# Patient Record
Sex: FEMALE | Race: WHITE | NOT HISPANIC OR LATINO | Employment: FULL TIME | URBAN - METROPOLITAN AREA
[De-identification: names, ages, dates, MRNs, and addresses within clinical notes are randomized per-mention and may not be internally consistent; named-entity substitution may affect disease eponyms.]

---

## 2021-07-28 ENCOUNTER — TRANSFERRED RECORDS (OUTPATIENT)
Dept: HEALTH INFORMATION MANAGEMENT | Facility: CLINIC | Age: 26
End: 2021-07-28

## 2021-07-28 LAB — PAP-ABSTRACT: NORMAL

## 2021-08-12 ENCOUNTER — TELEPHONE (OUTPATIENT)
Dept: MIDWIFE SERVICES | Facility: CLINIC | Age: 26
End: 2021-08-12

## 2021-08-12 NOTE — TELEPHONE ENCOUNTER
Scheduled NOB visit - has questions about current medications she is taking - please call her to discuss

## 2021-08-12 NOTE — TELEPHONE ENCOUNTER
Newly pregnant and is asking about current meds.    Citalopram  Adderall    Informed pt that adderall is not recommended in pregnancy, to call the prescribing provider to let them know she is pregnant and seek change in medication or recommendation.    Pt verbalized understanding, in agreement with plan, and voiced no further questions.    Deisy Seymour RN on 8/12/2021 at 11:30 AM

## 2021-08-22 ENCOUNTER — HEALTH MAINTENANCE LETTER (OUTPATIENT)
Age: 26
End: 2021-08-22

## 2021-08-30 ENCOUNTER — MYC MEDICAL ADVICE (OUTPATIENT)
Dept: MIDWIFE SERVICES | Facility: CLINIC | Age: 26
End: 2021-08-30

## 2021-09-05 ENCOUNTER — MYC MEDICAL ADVICE (OUTPATIENT)
Dept: MIDWIFE SERVICES | Facility: CLINIC | Age: 26
End: 2021-09-05

## 2021-09-05 DIAGNOSIS — O21.9 NAUSEA AND VOMITING IN PREGNANCY: Primary | ICD-10-CM

## 2021-09-07 RX ORDER — METOCLOPRAMIDE 5 MG/1
5-10 TABLET ORAL 4 TIMES DAILY PRN
Qty: 56 TABLET | Refills: 0
Start: 2021-09-07 | End: 2021-09-07

## 2021-09-07 RX ORDER — METOCLOPRAMIDE 5 MG/1
5-10 TABLET ORAL 4 TIMES DAILY PRN
Qty: 56 TABLET | Refills: 0 | Status: SHIPPED | OUTPATIENT
Start: 2021-09-07 | End: 2021-10-01

## 2021-09-07 NOTE — TELEPHONE ENCOUNTER
"I signed a Reglan order for \"no print.\" When she gets us the pharmacy, will you please change the pharmacy and resend it.    Thanks,  Keagan"

## 2021-09-07 NOTE — TELEPHONE ENCOUNTER
Routing pt Beelinehart message to provider to advise.    Mary Ellen Caruso RN on 9/7/2021 at 6:18 AM

## 2021-09-16 ENCOUNTER — PRENATAL OFFICE VISIT (OUTPATIENT)
Dept: NURSING | Facility: CLINIC | Age: 26
End: 2021-09-16
Payer: COMMERCIAL

## 2021-09-16 VITALS — HEIGHT: 70 IN

## 2021-09-16 DIAGNOSIS — O36.80X0 PREGNANCY WITH INCONCLUSIVE FETAL VIABILITY: Primary | ICD-10-CM

## 2021-09-16 DIAGNOSIS — O09.91 SUPERVISION OF HIGH RISK PREGNANCY IN FIRST TRIMESTER: ICD-10-CM

## 2021-09-16 PROCEDURE — 99207 PR NO CHARGE NURSE ONLY: CPT

## 2021-09-16 RX ORDER — CITALOPRAM HYDROBROMIDE 40 MG/1
40 TABLET ORAL DAILY
COMMUNITY
End: 2022-12-06

## 2021-09-16 NOTE — PROGRESS NOTES
SUBJECTIVE:     HPI:    This is a 26 year old female patient,  who presents for her first obstetrical visit.    BONNIE: 2022, by Last Menstrual Period.  She is 8w5d weeks 2021 at nurse visit.  Her cycles are regular.  Her last menstrual period was normal.   Since her LMP, she has experienced  nausea and fatigue) weight gain.   She denies emesis, abdominal pain, headache, loss of appetite, vaginal discharge, dysuria, pelvic pain, urinary urgency, lightheadedness, urinary frequency, vaginal bleeding, hemorrhoids and constipation.    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 Interested in carrier screening and would like to discuss.      Have you travelled during the pregnancy?No  Have your sexual partner(s) travelled during the pregnancy?No    HISTORY:   Planned Pregnancy: No-was not preventing and found out right before the wedding  Marital Status: -West, newly   Occupation:   Living in Household: Spouse    Past History:  Her past medical history   Past Medical History:   Diagnosis Date     ADHD      Anxiety    .      She has a history of  First Pregnancy    Since her last LMP she denies use of alcohol, tobacco and street drugs.    Past medical, surgical, social and family history were reviewed and updated in [facility].      Current Outpatient Medications   Medication     citalopram (CELEXA) 40 MG tablet     metoclopramide (REGLAN) 5 MG  tablet     Prenatal Vit-Fe Fumarate-FA (PRENATAL VITAMINS PO)     No current facility-administered medications for this visit.       ROS:   12 point review of systems negative other than symptoms noted below or in the HPI.  Constitutional: Fatigue  Gastrointestinal: Nausea      Nurse phone visit completed. Prenatal book and folder (containing standard educational hand-outs and brochures) will be given at next visit to patient. Information in folder reviewed over the phone. Questions answered. Brochure given on optional screening available to assess chromosomal anomalies. Pt would like to discuss NIPS AND Carrier Risk. Pt advised to call the clinic if she has any questions or concerns related to her pregnancy. Prenatal labs future ordered. New prenatal visit scheduled on 9/21/2021 with SARAVANAN Higgins RN      No results found for: [facility] - Patient will bring in a copy of report to update records; states negative    PHQ-9 score:    PHQ 9/16/2021   PHQ-9 Total Score 3   Q9: Thoughts of better off dead/self-harm past 2 weeks Not at all       YAJAIRA-7 SCORE 9/16/2021   Total Score 1 (minimal anxiety)   Total Score 1         Patient supplied answers from flow sheet for:  Prenatal OB Questionnaire.  Past Medical History  Have you ever recieved care for your mental health? : (!) Yes  Have you ever been in a major accident or suffered serious trauma?: No  Within the last year, has anyone hit, slapped, kicked or otherwise hurt you?: No  In the last year, has anyone forced you to have sex when you didn't want to?: No    Past Medical History 2   Have you ever received a blood transfusion?: No  Would you accept a blood transfusion if was medically recommended?: Yes  Does anyone in your home smoke?: No   Is your blood type Rh negative?: Unknown  Have you ever ?: No  Have you been hospitalized for a nonsurgical reason excluding normal delivery?: No  Have you ever had an abnormal pap  smear?: No    Past Medical History (Continued)  Do you have a history of abnormalities of the uterus?: No  Did your mother take SILVIA or any other hormones when she was pregnant with you?: No  Do you have any other problems we have not asked about which you feel may be important to this pregnancy?: No

## 2021-09-20 NOTE — PATIENT INSTRUCTIONS
Thank you for coming to see the Midwives at the   [facility]!      Please take prenatal vitamin with DHA and vitamin D3 4,000-5,000 international unit(s) once daily during the entire pregnancy.      We will notify you about your labs that were drawn today once we get the results back.  If you have MyChart your lab results will be posted there.      Someone from the clinic will call you personally or send you a [employer] message with your results.      If you need any refills of medications please call your pharmacy and they will contact us.      If you have a medical emergency please call 911.      If you have any concerns about today's visit, wish to schedule another appointment, or have an urgent medical concern please call our office at [phone]. You can also make appointments through [employer].      After hours you may also call the clinic number above to be connected with [facility]'s after hours triage nurse.  The nurse can page the midwife on call if needed. There is always a midwife on call 24 hours a day.    Prenatal Care Recommendations:    Before 14 weeks: Dating ultrasound, genetic testing       This ultrasound helps us determine your dates accurately. Innatal (genetic screening test) can be drawn anytime after 10 weeks of gestation.    16 weeks: Optional genetic testing single AFP       This testing helps understand your baby's risk for some genetic abnormalities.    18-22 weeks:  Screening anatomy ultrasound       This testing will look for early growth abnormalities, placenta location, and may tell the baby's gender if you wish to find out.    24-27 weeks: One hour diabetes test (GCT) and complete blood count       This test helps identify diabetes of pregnancy or gestational diabetes.  We also look   at the iron in your blood and how well your blood clots.    28 - 36 weeks: Tetanus shot (Tdap)       This shot helps protect you and your baby from whooping cough.    36 weeks and  later: Group B Strep test (GBS)       This test helps predict if you need antibiotics in labor to prevent infection for your baby.    Anytime September to April:  Flu shot       This shot helps protect you and your family from the flu.  This is especially important during pregnancy.        The typical schedule after your first visit today you can expect:     Visit 2 - 12-16 weeks  Visit 3 - 20 weeks  Visit 4 - 24 weeks  Visit 5 - 28 weeks  Visit 6 - 30 weeks  Visit 7 - 32 weeks  Visit 8 - 34 weeks  Visit 9 - 36 weeks  Weekly after 36 weeks until delivery.        Any time during or after your pregnancy you may experience increased depression and/or mood changes.    We are here to support you. Please contact us if you are:    Feeling anxious    Overwhelmed or sad     Trouble sleeping    Crying uncontrollably    Trouble caring for yourself or baby.    Any thoughts of hurting yourself, your baby, or anyone else    If anything comes up between your visits or you have concerns please don't hesitate to contact us.    Secure access to your medical record:  Use [employer] (secure email communication and access to your chart) to send your primary care provider a message or make an appointment. Ask someone on your Team how to sign up for [employer]. To log on to [employer] or for more information in [employer] please visit the website at [email or web address].       Certified Nurse Midwife (CNM) Team    VINNY Aguirre CNM Melissa Kitzman APRN, CNM, [facility]-BC  PEE Galloway DNP, PEE Pickard DNP, VINNY      Again, thank you for choosing the midwives at [facility].  We are excited to be a part of your pregnancy. Please let us know how we can best partner with you to improve your and your family's health.      ------------------------------------------      Remedies for nausea and vomiting with pregnancy      Eat small frequent meals every 2-3 hours if possible.        Avoid food at extremes of temperature and drinks with carbonation.      Eat foods that appeal to you, avoiding fats and spicy foods.      Avoid liquids with foods.  Drink liquids 30-60 minutes before or after eating and sip slowly.      Bread, pasta, crackers, potatoes, and rice tend to be tolerated the best.      Don't worry about what you eat in the first 3 months, it is more important that you can eat and keep it down.       Try flat ginger ale or ginger tea      Before rising in the morning, eat a small amount of crackers or dry toast.      Peppermint tea      Ginger is a herbal remedy for nausea and you can use it in any form.  There are ginger tablets you can purchase.  The dose 1000 mg a day in divided doses.       You may also try doxylamine (Unisom) 12.5 mg three times a day which is a sleeping medication along with Vitamin B6 25 mg three times a day.  This combination takes up to a week to work so give it some time.       Benadryl (diphenhydramine) 25-50 mg every 8 hour or Dramamine (dimenhydrinate)  mg by mouth every 4-6 hours. Both of these medication may cause some drowsiness      Other things that may help include an Accupressure band and acupuncture      If these methods fail there are many prescriptions that we can try    If you begin to vomit more than 5 or 6 times a day and feel that you are unable to keep anything down, call the [facility] at [phone]      ------------------------------------------      Constipation    Constipation can be caused by many factors such as poor diet, lack of activity, and medications. Often times women experience more constipation during pregnancy due to decreased intestinal motility.    DRINK TONS OF WATER! 2.5 liters (4 pints) of water per day      Activity is very important. Increase your daily activity to at least 20-60 minutes. This increases blood flow to your gut and improves bowel function    Limit caffeine and alcohol  intake    Avoid foods you've identified as constipating    Increase fiber intake: there are ways to increase you fiber through your diet but there are also OTC agents such as Metamucil or Benfiber that you can supplement your diet with    Use probiotics such as Florastor and/or prebiotics such as oligosaccharides    Consider using an Omega 3 supplement, flaxseed and camille seeds are great sources    Plan adequate time for elimination, it is most effective right after activity, and it may be beneficial to plan a consistent time daily    Food Suggestions      Eat beans, nuts, whole grain breads and cereals, oats, barley, figs, apples with skin, raisins, green leafy vegetables, fresh and dried fruits (especially grapes), prunes or prune juice    Eat popcorn nightly    Eat 2-3 salads per day    Add a pinch of cayenne pepper to food    1-2 tbsp of olive oil per day    Lemon juice and/or honey in warm water every morning before breakfast    Decrease red meat, refined white flour products like white rice, white bread and pasta    Tea infusions of: rosemary, chamomile, lemon balm, senna leaf, alfalfa, fennel seeds, lavender, cascara, dandelion, licorice root tea, psyllium seeds, marshmallow root    Other remedies      Increase Vitamin B and E (800 IU if not pregnant, 400 IU if pregnant per day) and potassium, calcium, and magnesium supplements      If these remedies fail, medications are an option as well. Please talk with your midwife if you continue to struggle with constipation. If you are pregnant please double check with us before starting any medications!      --------------------------------------------        Fish Safety During Pregnancy    Often time's women worry about eating fish during pregnancy. Fish can be totally safe to eat during pregnancy.However, some fish may contain contaminants that could harm you or your baby if you eat certain types of fish or eat fish too often. Below is a list of which types of fish  to eat, how often to eat fish, and which types of fish to avoid while pregnant.    Reasons to eat fish:    Fish is a great source of protein, vitamins, and minerals.    The oils found in fish are important to unborn and breast fed babies    Eating fish may play a role in the prevention of heart disease in adults       Fish to EAT while pregnant   2 servings per week of any of these types of fish    Catfish (farm raised)  Cod    Crab    Toure    Flatfish   Oysters    [place]   [place]     Sardines   Scallops    Shrimp   Tilapia   1 serving per week of any of these fish    Canned LIGHT tuna     [place] caught-        Sun[place]elin [place]   Ebro    Yellow Perch   1 serving per MONTH of these types of fish    Canned WHITE tuna  [place]aguayan Sea Brown    Grouper   Halibut    [place]    Kennard Roughy    Tuna steak   [place] caught-    Bass    Catfish    Walleye smaller than 20 inches    Northern Pender smaller than 30 inches         Servings of fish should be based on your weight, 1 oz for every 20 lbs of body weight. For example: 130 lb person can safely eat 7 oz     150 lb person can safely eat 8 oz     170 lb person can safely eat 9 oz      Make sure to space out meals with fish throughout the month, don't eat all your fish meals for the month within a few days.       Fish to Avoid while Pregnant:      Shark   Dejuan Mackerel    Swordfish  Raw Sushi-any type of fish    Tile fish         [place] Caught:      Walleye longer than 20 in    Northern P[place] longer than 30 in    Musky      Contaminants:      Mercury comes from air pollution and small amounts of mercury can damage the brain that is just starting to form or grow. Too much mercury may affect a child's behavior and lead to learning problems later in life. Too much mercury in adults and older children may cause tingling, numbness in hands and feet, or vision changes    PCBs a man-made substance once used in electrical transformers but was banned in 1967  although it can still be found in the [place]. A baby who are exposed to PCBs during pregnancy may have a lower birth weight, reduced head size, and delayed physical development. Exposure to PCBs may also cause cancer    PFOS is a man-made chemical to make products that resist heat, oil, stains, and grease. Studies in lab animals exposed to low levels of PFOS showed decreas HDL (good cholesterol) and changes in thyroid hormone levels. The concern about PFOS is with long-term exposure such as consuming large amounts over a long period of time     Mercury and PFOS cannot be removed through cooking or cleaning. By removing fat when cleaning and cooking you can reduce PCBs.      For more information and up to date information about fish safety in [place] please contact the [place] Department of Health ([facility]) or the Department of Natural Resources (DNR):    [email or web address].  DNR: [phone]  [facility]: [phone]      -------------------------------------------      Recommendations for total and rate of weight gain during pregnancy, by prepregnancy BMI    Total weight gain Rates of weight gain*  2nd and 3rd trimester   Prepregnancy BMI Range in kg Range in lbs Mean (range) in kg/week Mean (range) in lbs/week   Underweight (<18.5 kg/m2) 12.5-18 28-40 0.51 (0.44-0.58) 1 (1-1.3)   Normal weight (18.5-24.9 kg/m2) 11.5-16 25-35 0.42 (0.35-0.50) 1 (0.8-1)   Overweight (25.0-29.9 kg/m2) 7-11.5 15-25 0.28 (0.23-0.33) 0.6 (0.5-0.7)   Obese (?30.0 kg/m2) 5-9 11-20 0.22 (0.17-0.27) 0.5 (0.4-0.6)   BMI: body mass index.  * Calculations assume a 0.5-2 kg (1.1-4.4 lbs) weight gain in the first trimester.  * To calculate BMI go to [email or web address]/

## 2021-09-21 ENCOUNTER — PRENATAL OFFICE VISIT (OUTPATIENT)
Dept: MIDWIFE SERVICES | Facility: CLINIC | Age: 26
End: 2021-09-21
Payer: COMMERCIAL

## 2021-09-21 ENCOUNTER — ANCILLARY PROCEDURE (OUTPATIENT)
Dept: ULTRASOUND IMAGING | Facility: CLINIC | Age: 26
End: 2021-09-21
Payer: COMMERCIAL

## 2021-09-21 VITALS
HEIGHT: 70 IN | DIASTOLIC BLOOD PRESSURE: 62 MMHG | HEART RATE: 68 BPM | BODY MASS INDEX: 23.34 KG/M2 | WEIGHT: 163 LBS | SYSTOLIC BLOOD PRESSURE: 104 MMHG

## 2021-09-21 DIAGNOSIS — Z80.3 FAMILY HISTORY OF BREAST CANCER: ICD-10-CM

## 2021-09-21 DIAGNOSIS — Z13.79 ENCOUNTER FOR GENETIC SCREENING: ICD-10-CM

## 2021-09-21 DIAGNOSIS — O09.91 SUPERVISION OF HIGH RISK PREGNANCY IN FIRST TRIMESTER: ICD-10-CM

## 2021-09-21 DIAGNOSIS — R11.0 NAUSEA: ICD-10-CM

## 2021-09-21 DIAGNOSIS — O09.91 SUPERVISION OF HIGH RISK PREGNANCY IN FIRST TRIMESTER: Primary | ICD-10-CM

## 2021-09-21 DIAGNOSIS — F90.9 ATTENTION DEFICIT HYPERACTIVITY DISORDER (ADHD), UNSPECIFIED ADHD TYPE: ICD-10-CM

## 2021-09-21 DIAGNOSIS — Z82.79 FAMILY HISTORY OF CHROMOSOMAL ABNORMALITY: ICD-10-CM

## 2021-09-21 DIAGNOSIS — Z3A.09 9 WEEKS GESTATION OF PREGNANCY: ICD-10-CM

## 2021-09-21 DIAGNOSIS — F41.9 ANXIETY: ICD-10-CM

## 2021-09-21 DIAGNOSIS — N83.209 SIMPLE OVARIAN CYST: ICD-10-CM

## 2021-09-21 DIAGNOSIS — O36.80X0 PREGNANCY WITH INCONCLUSIVE FETAL VIABILITY: ICD-10-CM

## 2021-09-21 LAB
ALBUMIN UR-MCNC: NEGATIVE MG/DL
APPEARANCE UR: CLEAR
BILIRUB UR QL STRIP: NEGATIVE
COLOR UR AUTO: YELLOW
GLUCOSE UR STRIP-MCNC: NEGATIVE MG/DL
HGB UR QL STRIP: NEGATIVE
KETONES UR STRIP-MCNC: NEGATIVE MG/DL
LEUKOCYTE ESTERASE UR QL STRIP: NEGATIVE
NITRATE UR QL: NEGATIVE
PH UR STRIP: 5 [PH] (ref 5–7)
SP GR UR STRIP: 1.02 (ref 1–1.03)
UROBILINOGEN UR STRIP-ACNC: 0.2 E.U./DL

## 2021-09-21 PROCEDURE — 76817 TRANSVAGINAL US OBSTETRIC: CPT | Performed by: OBSTETRICS & GYNECOLOGY

## 2021-09-21 PROCEDURE — 87086 URINE CULTURE/COLONY COUNT: CPT | Performed by: ADVANCED PRACTICE MIDWIFE

## 2021-09-21 PROCEDURE — 81003 URINALYSIS AUTO W/O SCOPE: CPT | Performed by: ADVANCED PRACTICE MIDWIFE

## 2021-09-21 PROCEDURE — 99207 PR FIRST OB VISIT: CPT | Performed by: ADVANCED PRACTICE MIDWIFE

## 2021-09-21 RX ORDER — ONDANSETRON 4 MG/1
4-8 TABLET, ORALLY DISINTEGRATING ORAL EVERY 8 HOURS PRN
Qty: 30 TABLET | Refills: 0 | Status: SHIPPED | OUTPATIENT
Start: 2021-09-21 | End: 2021-10-20

## 2021-09-21 ASSESSMENT — MIFFLIN-ST. JEOR: SCORE: 1554.61

## 2021-09-21 NOTE — PROGRESS NOTES
HPI:     This is a 26 year old female patient,  who presents for her first obstetrical visit.    BONNIE: 2022, by Last Menstrual Period.  She is 8w5d weeks 2021 at nurse visit.  Her cycles are regular.  Her last menstrual period was normal.   Since her LMP, she has experienced  nausea and fatigue) weight gain.   She denies emesis, abdominal pain, headache, loss of appetite, vaginal discharge, dysuria, pelvic pain, urinary urgency, lightheadedness, urinary frequency, vaginal bleeding, hemorrhoids and constipation.     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 Wants to try something else for nausea.      3) Genetic counseling:   (A) Paternal grandmother with breast CA hx prior to age 39 y/o and was encouraged to have genetic screening and has not.     (B) Mother had 2 miscarriages and had genetic screening and had abnormality on chromosome 12 and 15. Her brother has it also, but sister does not. Patient was going to do screening but became pregnant. Interested in carrier screening and would like to discuss.     Have you travelled during the pregnancy?No  Have your sexual partner(s) travelled during the pregnancy?No     HISTORY:   Planned Pregnancy: No-was not preventing and found out right before the wedding  Marital Status: -West, newly . West is in graduate school and will graduate at the end of .  Occupation:   Living in Household: Spouse     Past History:  Her past medical history   Past Medical History        Past Medical History:   Diagnosis Date     ADHD       Anxiety        .       She has a history of  First Pregnancy     Since her last LMP she denies use of alcohol, tobacco and street drugs.     Past medical, surgical,  "social and family history were reviewed and updated in [facility].        Current Outpatient Medications   Medication     citalopram (CELEXA) 40 MG tablet     metoclopramide (REGLAN) 5 MG tablet     Prenatal Vit-Fe Fumarate-FA (PRENATAL VITAMINS PO)      No current facility-administered medications for this visit.         ROS:   12 point review of systems negative other than symptoms noted below or in the HPI.  Constitutional: Fatigue  Gastrointestinal: Nausea        Nurse phone visit completed. Prenatal book and folder (containing standard educational hand-outs and brochures) will be given at next visit to patient. Information in folder reviewed over the phone. Questions answered. Brochure given on optional screening available to assess chromosomal anomalies. Pt would like to discuss NIPS AND Carrier Risk. Pt advised to call the clinic if she has any questions or concerns related to her pregnancy. Prenatal labs future ordered. New prenatal visit scheduled on 9/21/2021 with SARAVANAN Higgins RN        No results found for: [facility] - Patient will bring in a copy of report to update records; states negative.      OBJECTIVE:     EXAM:  /62   Pulse 68   Ht 1.778 m (5' 10\")   Wt 73.9 kg (163 lb)   LMP 07/17/2021   BMI 23.39 kg/m   Body mass index is 23.39 kg/m .    GENERAL: healthy, alert and no distress  EYES: Eyes grossly normal to inspection  RESP: unlabored breathing  ABDOMEN: soft, nontender, no hepatosplenomegaly, no masses and bowel sounds normal  MS: no gross musculoskeletal defects noted, no edema  SKIN: no suspicious lesions or rashes  NEURO: Normal strength and tone, mentation intact and speech normal  PSYCH: mentation appears normal, affect normal/bright    ASSESSMENT/PLAN:       ICD-10-CM    1. Supervision of high risk pregnancy in first trimester  O09.91 Urine Culture Aerobic Bacterial     *UA reflex to Microscopic     ondansetron (ZOFRAN-ODT) 4 MG ODT tab     [facility] Referral - Pregnancy   2. Attention deficit hyperactivity disorder (ADHD), unspecified ADHD type  F90.9    3. Anxiety  F41.9    4. Family history of breast cancer  Z80.3 [facility] Referral - Pregnancy   5. Family history of chromosomal abnormality  Z82.79 [facility] Referral - Pregnancy   6. 9 weeks gestation of pregnancy  Z3A.09    7. Encounter for genetic screening  Z13.79 [facility] Non-Invasive Prenatal Screening   8. Nausea  R11.0 ondansetron (ZOFRAN-ODT) 4 MG ODT tab   9. Simple ovarian cyst  N83.209        26 year old , On 2021 patient is 9w3d weeks of pregnancy with BONNIE of 2022, by Last Menstrual Period    Discussed as follows:    1) ADHD and anxiety: Discussed normalcy of changing moods in pregnancy and postpartum period. Her psychiatrist will continue to prescribe and we will monitor moods through pregnancy.     2) Nausea: Discussed normal lifespan of nausea in pregnancy. Discussed trying Zofran to see if it helps. Rx sent. If it doesn't work, she will switch back to Reglan.      3) Genetic counseling: Discussed the best option for understanding risk factors and testing options is to talk with a genetic counselor. Plan to refer David to [facility] genetic counseling. She is agreeable to plan of care. NIPT ordered. Consent reviewed and signed.     4) Discussed ultrasound results in person. Discussed simple cysts are normally of no concern and will be followed up at the 20 week ultrasound.     Plan to get all new ob labs and NIPT drawn next week at 10 weeks.    Does not met criteria for starting 81 mg of aspirin for preeclampsia prevention.         COUNSELING  Instructed on use of triage nurse line and contacting the on call CNM after hours in an emergency.   Symptoms of N&V and fatigue usually start to resolve around 12-16 weeks   Reviewed CNM philosophy, call schedule for labor and delivery, and FSH for delivery  1st OB handout given outlining appointment spacing  and CNM information  Reviewed exercise and nutrition  Recommend to gain 25-35 pounds with her pregnancy.  Encouraged patient to take PNV's/DHA    Will return to the clinic in 4 weeks for her next routine prenatal check.  Will call to be seen sooner if problems arise.      PEE AdenM

## 2021-09-22 ENCOUNTER — TRANSCRIBE ORDERS (OUTPATIENT)
Dept: MATERNAL FETAL MEDICINE | Facility: CLINIC | Age: 26
End: 2021-09-22

## 2021-09-22 DIAGNOSIS — O26.90 PREGNANCY RELATED CONDITION, ANTEPARTUM: Primary | ICD-10-CM

## 2021-09-22 LAB — BACTERIA UR CULT: NO GROWTH

## 2021-09-23 ENCOUNTER — PRE VISIT (OUTPATIENT)
Dept: MATERNAL FETAL MEDICINE | Facility: CLINIC | Age: 26
End: 2021-09-23

## 2021-09-27 ENCOUNTER — LAB (OUTPATIENT)
Dept: LAB | Facility: CLINIC | Age: 26
End: 2021-09-27
Payer: COMMERCIAL

## 2021-09-27 DIAGNOSIS — Z13.79 ENCOUNTER FOR GENETIC SCREENING: ICD-10-CM

## 2021-09-27 DIAGNOSIS — O09.91 SUPERVISION OF HIGH RISK PREGNANCY IN FIRST TRIMESTER: ICD-10-CM

## 2021-09-27 LAB
ABO/RH(D): NORMAL
ANTIBODY SCREEN: NEGATIVE
ERYTHROCYTE [DISTWIDTH] IN BLOOD BY AUTOMATED COUNT: 12.1 % (ref 10–15)
HBV SURFACE AG SERPL QL IA: NONREACTIVE
HCT VFR BLD AUTO: 37.4 % (ref 35–47)
HCV AB SERPL QL IA: NONREACTIVE
HGB BLD-MCNC: 12.3 G/DL (ref 11.7–15.7)
HIV 1+2 AB+HIV1 P24 AG SERPL QL IA: NONREACTIVE
MCH RBC QN AUTO: 30.1 PG (ref 26.5–33)
MCHC RBC AUTO-ENTMCNC: 32.9 G/DL (ref 31.5–36.5)
MCV RBC AUTO: 91 FL (ref 78–100)
PLATELET # BLD AUTO: 257 10E3/UL (ref 150–450)
RBC # BLD AUTO: 4.09 10E6/UL (ref 3.8–5.2)
RUBV IGG SERPL QL IA: 3.09 INDEX
RUBV IGG SERPL QL IA: POSITIVE
SPECIMEN EXPIRATION DATE: NORMAL
T PALLIDUM AB SER QL: NONREACTIVE
WBC # BLD AUTO: 9.7 10E3/UL (ref 4–11)

## 2021-09-27 PROCEDURE — 85027 COMPLETE CBC AUTOMATED: CPT

## 2021-09-27 PROCEDURE — 86762 RUBELLA ANTIBODY: CPT

## 2021-09-27 PROCEDURE — 86780 TREPONEMA PALLIDUM: CPT

## 2021-09-27 PROCEDURE — 86900 BLOOD TYPING SEROLOGIC ABO: CPT

## 2021-09-27 PROCEDURE — 86901 BLOOD TYPING SEROLOGIC RH(D): CPT

## 2021-09-27 PROCEDURE — 87389 HIV-1 AG W/HIV-1&-2 AB AG IA: CPT

## 2021-09-27 PROCEDURE — 86803 HEPATITIS C AB TEST: CPT

## 2021-09-27 PROCEDURE — 86850 RBC ANTIBODY SCREEN: CPT

## 2021-09-27 PROCEDURE — 36415 COLL VENOUS BLD VENIPUNCTURE: CPT

## 2021-09-27 PROCEDURE — 87340 HEPATITIS B SURFACE AG IA: CPT

## 2021-09-29 NOTE — PROGRESS NOTES
[facility]  Genetic Counseling Consult    Patient: David Clement YOB: 1995   Date of Service: 21      David Clement was seen at [facility] [place] for genetic consultation due to a family history of a chromosome rearrangement. The patient was unaccompanied to today's visit.       Impression/Plan:   1.  David underwent [employer] NIPT earlier in this pregnancy with her primary OB, which is still pending. She has declined diagnostic testing though is aware that this will remain available to her. Maternal serum AFP (single marker screen) is recommended after 15 weeks to screen for open neural tube defects. A quad screen should not be performed.    2. David has a family history of a chromosome translocation in her father and brother. David has not undergone testing for this familial rearrangement. Today she has elected to pursue chromosome analysis for herself. Consent obtained today, she had to return to work so will plan to have drawn this afternoon at [facility]. Results are expected within two weeks, and will be available in [employer]  We will contact her to discuss the results, and a copy will be forwarded to the office of the referring OB provider. David provided verbal permission for results to be left on her voicemail.    3. David has a family history of breast and colon cancer. Discussed availability of our cancer risk management clinic and David is very interested. Referral placed today.     Pregnancy History:   /Parity:    Age at Delivery: 27 year old  BONNIE: 2022, by Last Menstrual Period  Gestational Age: 10w4d    No significant complications or exposures were reported in the current pregnancy.    Medical History:   David s reported medical history is not expected to impact pregnancy management or risks to fetal development.       Family History:   A three-generation pedigree was obtained,  and is scanned under the  Media  tab.   The following significant findings were reported by David:    David's partner, West is 27 and healthy.     It was reported that David's father was identified to have a balanced chromosome translocation. David was able to show me a letter that was given to her parents when this was found from their providers. David's father's chromosome analysis revealed 46,XY,t(12;15)(p13;q26.1). David's mother had a normal chromosome analysis. David's mother was reported to have a history of two miscarriages at 12 and 16 weeks. Her mother underwent amniocentesis during pregnancy with her brother and her brother was identified to also carry the balanced translocation. David has not undergone testing for this familial rearrangement. Please see risk assessment below for further details.   David's paternal grandmother reportedly passed away at age 39 due to breast cancer. David's paternal grandfather reportedly passed away at age 65 due to colon cancer. David shared that there are additional relatives on her grandmother's side who have a history of breast cancer at young ages. Her father has never been diagnosed with cancer and she does not believe any family members have undergone testing related to this cancer history. We discussed how most cancer seen in families occurs sporadically, but about 5-10% may be due to an underlying genetic etiology. David was encouraged to share this family history information with her primary care providers to ensure appropriate screening. She was also made aware of the [facility]'s cancer risk management clinic and is very interested in a referral today.   It was reported that David's maternal aunt was found to have Cerebral Autosomal Dominant Arteriopathy with Sub-cortical Infarcts and Leukoencephalopathy (CADASIL) in her 50's, an inherited condition that causes stroke and other impairments. David's maternal grandfather  was also reported to have CADASIL and passed away at age 75. This condition is caused by genetic variants in the NOTCH3 gene and is inherited in an autosomal dominant fashion. David's mother is reportedly healthy. David expressed a good understanding of this condition and how it can pass through families. She shared that she is understandably not interested in testing or referral to neurology genetics as she would not want to know this information about her health.     Otherwise, the reported family history is negative for multiple miscarriages, stillbirths, birth defects, intellectual disability, known genetic conditions, and consanguinity.       Carrier Screening:   The patient reports that the father of the pregnancy has  ancestry:     Cystic fibrosis is an autosomal recessive genetic condition that occurs with increased frequency in individuals of  ancestry and carrier screening for this condition is available.  In addition,  screening in the [facility] includes cystic fibrosis.    The patient reports that she is of  ancestry:     We reviewed the clinical features, autosomal recessive inheritance, and options for carrier screening and  screening for hemoglobinopathies.      Expanded carrier screening for mutations in a large panel of genes associated with autosomal recessive conditions including cystic fibrosis, spinal muscular atrophy, and others, is now available.      The patient has declined the carrier screening options reviewed today.       Risk Assessment for Chromosome Conditions:   We explained that the risk for fetal chromosome abnormalities increases with maternal age. We discussed specific features of common chromosome abnormalities, including Down syndrome, trisomy 13, trisomy 18, and sex chromosome trisomies.      - At age 27 at midtrimester, the risk to have a baby with Down syndrome is 1 in 855.    - At age 27 at midtrimester, the risk to  have a baby with any chromosome abnormality is 1 in 428.       David underwent [facility]ae NIPT earlier in this pregnancy with her primary OB, which is still pending. We reviewed limitations of NIPT analysis including that it does not assess for abnormalities involving chromosomes 12 or 15.       David's father was identified to have a balanced chromosome translocation: 46,XY,t(12;15)(p13;q26.1). We reviewed general information regarding chromosome translocations. Balanced translocations are estimated to occur in about 1/500 individuals. Individuals with balanced translocations have the correct amount of genetic information, and therefore, usually do not have associated health concerns (except in rare instances where genes are impacted by breakpoints). However, when an individual who has a balanced chromosome translocation goes on to have children, this rearrangement can interfere with the proper segregation of chromosomes in the sperm. We reviewed that there are different ways that a 12;15  translocation can segregate during the process of meiosis. We reviewed that some segregations would be expected to lead to conceptions with normal chromosomes or with a balanced chromosome translocation (same as parent who carries it).  Other segregations during gametogenesis could lead to conceptions with an unbalanced amount of chromosome information. This can lead to failure to establish a recognized pregnancy, or apparent infertility due to repeated pregancies with unbalanced chromosomes, or an ongoing pregnancy wich may or may not continue to term or may lead to the birth of a child with physical and intellectual disabilities. David has not undergone testing for this familial rearrangement and is interested in pursuing this. Benefits and limitations of maternal chromosome analysis were discussed and informed consent was obtained today. We reviewed that if David is also found to carry the balanced version, we can revisit  our discussion about risk to current and future pregnancies. David shared that she would not consider genetic amniocentesis due to her mother's poor experience with this procedure. In addition to prenatal diagnostic testing, IVF with PGT-SR is available for couples where a parent carries a chromosome rearrangement. Reviewed that if David carries the balanced translocation, more specific risk estimates can be obtained through prediction models performed by Dr. Erica Pena, a clinical cytogeneticist and  of [facility].       Testing Options:   We discussed the following additional options:   Chorionic villus sampling (CVS)    Invasive procedure typically performed in the first trimester by which placental villi are obtained for the purpose of chromosome analysis and/or other prenatal genetic analysis    Diagnostic results; >99% sensitivity for fetal chromosome abnormalities    Cannot test for open neural tube defects; maternal serum AFP after 15 weeks is recommended     Genetic Amniocentesis    Invasive procedure typically performed in the second trimester by which amniotic fluid is obtained for the purpose of chromosome analysis and/or other prenatal genetic analysis    Diagnostic results; >99% sensitivity for fetal chromosome abnormalities    AFAFP measurement tests for open neural tube defects    We reviewed the benefits and limitations of this testing.  Screening tests provide a risk assessment specific to the pregnancy for certain fetal chromosome abnormalities, but cannot definitively diagnose or exclude a fetal chromosome abnormality.  Follow-up genetic counseling and consideration of diagnostic testing is recommended with any abnormal screening result.     Diagnostic tests carry inherent risks- including risk of miscarriage- that require careful consideration.  These tests can detect fetal chromosome abnormalities with greater than 99% certainty.  Results can be compromised  by maternal cell contamination or mosaicism, and are limited by the resolution of cytogenetic G-banding technology.  There is no screening nor diagnostic test that can detect all forms of birth defects or mental disability.     It was a pleasure to be involved with David s care. Face-to-face time of the meeting was 20 minutes.    Nivia Olson MS, [facility]  Licensed Genetic Counselor  [facility]  Maternal Fetal Medicine  Ph: [phone]  [email or web address]

## 2021-09-30 ENCOUNTER — DOCUMENTATION ONLY (OUTPATIENT)
Dept: ONCOLOGY | Facility: CLINIC | Age: 26
End: 2021-09-30

## 2021-09-30 ENCOUNTER — OFFICE VISIT (OUTPATIENT)
Dept: MATERNAL FETAL MEDICINE | Facility: CLINIC | Age: 26
End: 2021-09-30
Attending: ADVANCED PRACTICE MIDWIFE
Payer: COMMERCIAL

## 2021-09-30 DIAGNOSIS — O26.90 PREGNANCY RELATED CONDITION, ANTEPARTUM: ICD-10-CM

## 2021-09-30 DIAGNOSIS — Z80.3 FAMILY HISTORY OF MALIGNANT NEOPLASM OF BREAST: ICD-10-CM

## 2021-09-30 DIAGNOSIS — Z82.79 FAMILY HISTORY OF AUTOSOMAL TRANSLOCATION: Primary | ICD-10-CM

## 2021-09-30 PROCEDURE — 96040 HC GENETIC COUNSELING, EACH 30 MINUTES: CPT | Performed by: GENETIC COUNSELOR, MS

## 2021-10-01 ENCOUNTER — MYC MEDICAL ADVICE (OUTPATIENT)
Dept: MIDWIFE SERVICES | Facility: CLINIC | Age: 26
End: 2021-10-01

## 2021-10-01 ENCOUNTER — MYC REFILL (OUTPATIENT)
Dept: MIDWIFE SERVICES | Facility: CLINIC | Age: 26
End: 2021-10-01

## 2021-10-01 DIAGNOSIS — O21.9 NAUSEA AND VOMITING IN PREGNANCY: ICD-10-CM

## 2021-10-01 RX ORDER — METOCLOPRAMIDE 5 MG/1
5-10 TABLET ORAL 4 TIMES DAILY PRN
Qty: 56 TABLET | Refills: 0 | Status: SHIPPED | OUTPATIENT
Start: 2021-10-01 | End: 2022-03-16

## 2021-10-01 RX ORDER — METOCLOPRAMIDE 5 MG/1
5-10 TABLET ORAL 4 TIMES DAILY PRN
Qty: 56 TABLET | Refills: 0 | OUTPATIENT
Start: 2021-10-01

## 2021-10-01 NOTE — TELEPHONE ENCOUNTER
"Requested Prescriptions   Pending Prescriptions Disp Refills     metoclopramide (REGLAN) 5 MG tablet 56 tablet 0     Sig: Take 1-2 tablets (5-10 mg) by mouth 4 times daily as needed (nausea)        Antivertigo/Antiemetic Agents Passed - 10/1/2021 12:33 PM        Passed - Recent (12 mo) or future (30 days) visit within the authorizing provider's specialty     Patient has had an office visit with the authorizing provider or a provider within the authorizing providers department within the previous 12 mos or has a future within next 30 days. See \"Patient Info\" tab in inbasket, or \"Choose Columns\" in Meds & Orders section of the refill encounter.              Passed - Medication is active on med list        Passed - Patient is 18 years of age or older           Rx denied  Pt should contact provider-update on Zofran rx  Deloris Pollock RN on 10/1/2021 at 12:36 PM    See [employer] message-pt would like to go back to Reglan. Rx approved  Deloris Pollock RN on 10/1/2021 at 12:41 PM      "

## 2021-10-04 LAB — SCANNED LAB RESULT: NORMAL

## 2021-10-07 ENCOUNTER — TELEPHONE (OUTPATIENT)
Dept: MIDWIFE SERVICES | Facility: CLINIC | Age: 26
End: 2021-10-07

## 2021-10-07 NOTE — TELEPHONE ENCOUNTER
Calling about her invitae results - would prefer to see them in [employer] if possible, if not shes okay with a call but would also prefer the phone call to be from 1:40-2:05     Callback: [phone]

## 2021-10-08 ENCOUNTER — LAB (OUTPATIENT)
Dept: LAB | Facility: CLINIC | Age: 26
End: 2021-10-08
Payer: COMMERCIAL

## 2021-10-08 DIAGNOSIS — Z82.79 FAMILY HISTORY OF AUTOSOMAL TRANSLOCATION: ICD-10-CM

## 2021-10-08 DIAGNOSIS — O26.90 PREGNANCY RELATED CONDITION, ANTEPARTUM: ICD-10-CM

## 2021-10-08 PROCEDURE — 88230 TISSUE CULTURE LYMPHOCYTE: CPT

## 2021-10-08 PROCEDURE — 88289 CHROMOSOME STUDY ADDITIONAL: CPT

## 2021-10-08 PROCEDURE — 88264 CHROMOSOME ANALYSIS 20-25: CPT

## 2021-10-08 PROCEDURE — 36415 COLL VENOUS BLD VENIPUNCTURE: CPT

## 2021-10-08 PROCEDURE — 88291 CYTO/MOLECULAR REPORT: CPT | Performed by: MEDICAL GENETICS

## 2021-10-17 ENCOUNTER — HEALTH MAINTENANCE LETTER (OUTPATIENT)
Age: 26
End: 2021-10-17

## 2021-10-18 NOTE — PROGRESS NOTES
Patient feels tired but good.  Here with mother.   States that nausea has resolved.   Is awaiting blood test results from MFM.  +FHTs, fetal movement on handheld US, not able to obtain with doppler.   Educated about diet, exercise and normal weight gain  Normal to feel movement between 18-22 weeks  Reviewed labs from 1st OB  Discussed genetic screening; patient has had NIPS.    Encouraged to call MFM to inquire about labs she had done there  Warning signs discussed    Return to clinic 5 weeks for anatomy US and OB visit    Monserrat GLASGOW CNM, [facility]  [phone]

## 2021-10-20 ENCOUNTER — PRENATAL OFFICE VISIT (OUTPATIENT)
Dept: MIDWIFE SERVICES | Facility: CLINIC | Age: 26
End: 2021-10-20
Payer: COMMERCIAL

## 2021-10-20 VITALS — DIASTOLIC BLOOD PRESSURE: 62 MMHG | BODY MASS INDEX: 23.96 KG/M2 | WEIGHT: 167 LBS | SYSTOLIC BLOOD PRESSURE: 106 MMHG

## 2021-10-20 DIAGNOSIS — F41.9 ANXIETY: ICD-10-CM

## 2021-10-20 DIAGNOSIS — Z82.79 FAMILY HISTORY OF CHROMOSOMAL ABNORMALITY: ICD-10-CM

## 2021-10-20 DIAGNOSIS — O09.91 SUPERVISION OF HIGH RISK PREGNANCY IN FIRST TRIMESTER: Primary | ICD-10-CM

## 2021-10-20 DIAGNOSIS — Z3A.13 13 WEEKS GESTATION OF PREGNANCY: ICD-10-CM

## 2021-10-20 PROBLEM — F41.1 GAD (GENERALIZED ANXIETY DISORDER): Status: ACTIVE | Noted: 2017-10-25

## 2021-10-20 PROCEDURE — 99207 PR PRENATAL VISIT: CPT | Performed by: NURSE PRACTITIONER

## 2021-10-20 NOTE — PATIENT INSTRUCTIONS
Over-the-counter (OTC) medications during pregnancy    Make sure to follow package directions for dosing and information unless otherwise noted on the list.    Morning sickness/nausea:      Unisom (doxylamine) 12.5 mg (1/2 tab) and Vitamin B6 25-50 mg three times daily. Unisom may cause some drowsiness as it is typically used for sleep. The combination of these medications can be very effective but they can also be taken separately    Dramamine (Dimenhydrinate) 25-50 mg every 4-6 hours as needed    Benadryl (diphenhydramine) 25-50 mg every 4-6 hours     Ginger tablets 1000 mg per day in divided doses    Constipation:      Colace (Docusate sodium)    Metamucil    Citrucel    Milk of magnesia    Fibercon    Miralax (if all other methods have failed)    Diarrhea:    Imodium (loperamide)    Heartburn:      Zantac (ranitidine)    Pepcid (famotidine)    Prilosec (omeprazole)    Antacids-Tums, Maalox (liquid or tablets), Rolaids  Pepto Bismol and Malorie Childs are NOT RECOMMENDED for use during pregnancy because they contain aspirin    Hemorrhoids:      Tucks pads/ointment    Anusol/Anusol-HC    Preparation H    Gas Pain  Simethicone (Gas-X, Mylanta Gas, Mylicon)      Cough, cold, and congestion:      Robitussin (dextromethorphan) and Robitussin DM (dextromethorphan and guaifenesin)    Cough drops/zinc lozenges    Mucinex    Sudafed (after 16 weeks gestation)    Vicks Vapo rub  Cough medicine with alcohol like Nyquil is not recommended during pregnancy    Headache:      Acetaminophen (Tylenol) 650 mg every 4-6 hours or 1000 mg every 6 hours, do not exceed 4000 mg in 24 hours   Ibuprofen (Advil/Motrin) and Naproxen (Aleve) are not recommended during the 1st or 3rd trimester of pregnancy    Allergy:      Benadryl (diphenhydramine)    Zyrtec (cetirizine)    Claritin (loratadine)    Rash/Itching:      Benadryl lotion    Cortaid cream (Hydrocortisone cream)    Benadryl (diphenhydramine)    Vaginal yeast infection:      Monistat  3 or 7 day    Gyne-Lotrimin    Acne:      Benzoyl Peroxide    Salicylic acid     If you have questions or concerns about any medications that are available OTC or you are unsure if something is safe call:    [facility]  [phone]

## 2021-10-27 LAB
CULTURE HARVEST COMPLETE DATE: NORMAL
INTERPRETATION: NORMAL
ISCN: NORMAL
METHODS: NORMAL

## 2021-10-28 ENCOUNTER — TELEPHONE (OUTPATIENT)
Dept: MATERNAL FETAL MEDICINE | Facility: CLINIC | Age: 26
End: 2021-10-28

## 2021-10-28 NOTE — TELEPHONE ENCOUNTER
LM for David regarding her normal chromosome results. Results indicate a normal karyotype 46, XX.  There is no evidence of a chromosome translocation. These results are available in EPIC for her primary OB provider to review.    Milla De Paz MS, [facility]  Maternal Fetal Medicine  [phone]

## 2021-11-22 ENCOUNTER — PRENATAL OFFICE VISIT (OUTPATIENT)
Dept: MIDWIFE SERVICES | Facility: CLINIC | Age: 26
End: 2021-11-22
Attending: NURSE PRACTITIONER
Payer: COMMERCIAL

## 2021-11-22 ENCOUNTER — ANCILLARY PROCEDURE (OUTPATIENT)
Dept: ULTRASOUND IMAGING | Facility: CLINIC | Age: 26
End: 2021-11-22
Attending: NURSE PRACTITIONER
Payer: COMMERCIAL

## 2021-11-22 VITALS — WEIGHT: 165.7 LBS | SYSTOLIC BLOOD PRESSURE: 108 MMHG | DIASTOLIC BLOOD PRESSURE: 60 MMHG | BODY MASS INDEX: 23.78 KG/M2

## 2021-11-22 DIAGNOSIS — O09.92 SUPERVISION OF HIGH RISK PREGNANCY IN SECOND TRIMESTER: Primary | ICD-10-CM

## 2021-11-22 DIAGNOSIS — O09.91 SUPERVISION OF HIGH RISK PREGNANCY IN FIRST TRIMESTER: ICD-10-CM

## 2021-11-22 DIAGNOSIS — O28.3 ABNORMAL FETAL ULTRASOUND: Primary | ICD-10-CM

## 2021-11-22 PROCEDURE — 76805 OB US >/= 14 WKS SNGL FETUS: CPT | Performed by: OBSTETRICS & GYNECOLOGY

## 2021-11-22 PROCEDURE — 99207 PR PRENATAL VISIT: CPT | Performed by: ADVANCED PRACTICE MIDWIFE

## 2021-11-22 NOTE — PROGRESS NOTES
Feeling well.  No complaints other than heart burn but managed with pepcid. Doesn't want to give up spicy food.   Some diarrhea last week.  Thinks it was related to food as well.   Planning on COVID booster.    Ultrasound today. Preliminary with RVOT not seen.  reassurance this is probably related to baby size and or position.   Placenta anterior.   RTC 4 weeks.   Day PEE Contreras CNM

## 2021-12-07 NOTE — PROGRESS NOTES
Feels pretty good, asking about next steps since RVOT not visualized again today on US.  Here with West today  Fetal movement: positive   Denies loss of fluid/vb/contractions  Anatomy ultrasound results discussed; to be reviewed by Dr.Stecher MELGAR, having a Girl, Placenta:  Anterior, unable to visualize RVOT  GCT visit between 24-28 weeks, handout provided, reminded of longer appointment  Round ligament pain and comfort measures reviewed  MFM referral placed for level 2 US   Return to clinic 4 weeks    Monserrat GLASGOW, VINNY, [facility]  [phone]

## 2021-12-08 ENCOUNTER — ANCILLARY PROCEDURE (OUTPATIENT)
Dept: ULTRASOUND IMAGING | Facility: CLINIC | Age: 26
End: 2021-12-08
Attending: ADVANCED PRACTICE MIDWIFE
Payer: COMMERCIAL

## 2021-12-08 ENCOUNTER — PRENATAL OFFICE VISIT (OUTPATIENT)
Dept: MIDWIFE SERVICES | Facility: CLINIC | Age: 26
End: 2021-12-08
Attending: ADVANCED PRACTICE MIDWIFE
Payer: COMMERCIAL

## 2021-12-08 VITALS — SYSTOLIC BLOOD PRESSURE: 106 MMHG | DIASTOLIC BLOOD PRESSURE: 66 MMHG | BODY MASS INDEX: 24.54 KG/M2 | WEIGHT: 171 LBS

## 2021-12-08 DIAGNOSIS — R93.89 ABNORMAL ULTRASOUND: ICD-10-CM

## 2021-12-08 DIAGNOSIS — Z36.2 ENCOUNTER FOR FOLLOW-UP ULTRASOUND OF FETAL ANATOMY: ICD-10-CM

## 2021-12-08 DIAGNOSIS — O28.3 ABNORMAL FETAL ULTRASOUND: ICD-10-CM

## 2021-12-08 DIAGNOSIS — O09.92 SUPERVISION OF HIGH RISK PREGNANCY IN SECOND TRIMESTER: Primary | ICD-10-CM

## 2021-12-08 DIAGNOSIS — Z3A.20 20 WEEKS GESTATION OF PREGNANCY: ICD-10-CM

## 2021-12-08 PROCEDURE — 99207 PR PRENATAL VISIT: CPT | Performed by: NURSE PRACTITIONER

## 2021-12-08 PROCEDURE — 76816 OB US FOLLOW-UP PER FETUS: CPT | Performed by: OBSTETRICS & GYNECOLOGY

## 2021-12-08 NOTE — PATIENT INSTRUCTIONS
Round Ligament Pain    Pregnancy can entail many normal discomforts. One of those discomforts may be round ligament pain. Round ligament pain occurs as your uterus and your baby grow and the muscles begin to stretch more in your abdomen. Round ligament pain is normal and not dangerous but can be very uncomfortable      Round ligament pain is typically described as an unpleasant sensation that ranges from a sharp knifelike pain to dull intermittent pain in the lower abdominal/suprapubic area of a pregnant woman      Virtually all pregnant women will experience this pain at some point during their pregnancies      It typically manifests between 16 and 20 weeks of pregnancy and can be incredibly bothersome especially for women who remain very active during their pregnancies       Please discuss with your midwife if you are having this type of pain; sometimes the pain can be associated with other medical conditions so it is important for us to assess you just to make sure    Comfort measures    There are certain things you can do to cope with round ligament pain and ease the discomfort you are having      Pregnancy support belt      Tylenol      Hot/ice packs      Baths       Exercise such as yoga and swimming that help stretch muscles      Prenatal massage      Reflexology to waist and pelvic joints       Positioning such as side-lying and hands and knees, make sure your abdomen is  well supported with pillows while doing different positions

## 2021-12-09 ENCOUNTER — PRE VISIT (OUTPATIENT)
Dept: MATERNAL FETAL MEDICINE | Facility: HOSPITAL | Age: 26
End: 2021-12-09

## 2021-12-09 ENCOUNTER — TRANSCRIBE ORDERS (OUTPATIENT)
Dept: MATERNAL FETAL MEDICINE | Facility: CLINIC | Age: 26
End: 2021-12-09
Payer: COMMERCIAL

## 2021-12-09 ENCOUNTER — OFFICE VISIT (OUTPATIENT)
Dept: MATERNAL FETAL MEDICINE | Facility: HOSPITAL | Age: 26
End: 2021-12-09
Attending: NURSE PRACTITIONER
Payer: COMMERCIAL

## 2021-12-09 ENCOUNTER — ANCILLARY PROCEDURE (OUTPATIENT)
Dept: ULTRASOUND IMAGING | Facility: HOSPITAL | Age: 26
End: 2021-12-09
Attending: NURSE PRACTITIONER
Payer: COMMERCIAL

## 2021-12-09 DIAGNOSIS — O26.90 PREGNANCY RELATED CONDITION, ANTEPARTUM: ICD-10-CM

## 2021-12-09 DIAGNOSIS — O26.90 PREGNANCY RELATED CONDITION, ANTEPARTUM: Primary | ICD-10-CM

## 2021-12-09 DIAGNOSIS — Z82.79 FAMILY HISTORY OF AUTOSOMAL TRANSLOCATION: Primary | ICD-10-CM

## 2021-12-09 PROCEDURE — 76811 OB US DETAILED SNGL FETUS: CPT

## 2021-12-09 PROCEDURE — 76811 OB US DETAILED SNGL FETUS: CPT | Mod: 26 | Performed by: OBSTETRICS & GYNECOLOGY

## 2021-12-09 PROCEDURE — 99207 PR NO CHARGE LOS: CPT | Performed by: OBSTETRICS & GYNECOLOGY

## 2021-12-09 NOTE — PROGRESS NOTES
"Please see \"Imaging\" tab under Chart Review for full details.    Meena Odonnell MD  Maternal Fetal Medicine    "

## 2022-01-05 ENCOUNTER — PRENATAL OFFICE VISIT (OUTPATIENT)
Dept: MIDWIFE SERVICES | Facility: CLINIC | Age: 27
End: 2022-01-05
Payer: COMMERCIAL

## 2022-01-05 VITALS — DIASTOLIC BLOOD PRESSURE: 64 MMHG | BODY MASS INDEX: 25.4 KG/M2 | WEIGHT: 177 LBS | SYSTOLIC BLOOD PRESSURE: 108 MMHG

## 2022-01-05 DIAGNOSIS — F41.1 GAD (GENERALIZED ANXIETY DISORDER): ICD-10-CM

## 2022-01-05 DIAGNOSIS — O09.92 SUPERVISION OF HIGH RISK PREGNANCY IN SECOND TRIMESTER: Primary | ICD-10-CM

## 2022-01-05 DIAGNOSIS — Z3A.24 24 WEEKS GESTATION OF PREGNANCY: ICD-10-CM

## 2022-01-05 PROCEDURE — 99207 PR PRENATAL VISIT: CPT | Performed by: ADVANCED PRACTICE MIDWIFE

## 2022-01-05 NOTE — PATIENT INSTRUCTIONS
Round Ligament Pain    Pregnancy can entail many normal discomforts. One of those discomforts may be round ligament pain. Round ligament pain occurs as your uterus and your baby grow and the muscles begin to stretch more in your abdomen. Round ligament pain is normal and not dangerous but can be very uncomfortable      Round ligament pain is typically described as an unpleasant sensation that ranges from a sharp knifelike pain to dull intermittent pain in the lower abdominal/suprapubic area of a pregnant woman      Virtually all pregnant women will experience this pain at some point during their pregnancies      It typically manifests between 16 and 20 weeks of pregnancy and can be incredibly bothersome especially for women who remain very active during their pregnancies       Please discuss with your midwife if you are having this type of pain; sometimes the pain can be associated with other medical conditions so it is important for us to assess you just to make sure    Comfort measures    There are certain things you can do to cope with round ligament pain and ease the discomfort you are having      Pregnancy support belt      Tylenol      Hot/ice packs      Baths       Exercise such as yoga and swimming that help stretch muscles      Prenatal massage      Reflexology to waist and pelvic joints       Positioning such as side-lying and hands and knees, make sure your abdomen is  well supported with pillows while doing different positions    Calcium Rich Foods    All premenopausal or women of child-bearing age need to get at least 1000 mg of calcium per day from diet and/or supplementation. Post menopausal women should get at least 1200 mg from diet and/or supplementation.    Food     Amount   Calcium (mg)  Dairy  Yogurt     1 cup   400   Ice Cream/Frozen yogurt 1/2 cup  100  Milk 1% or 2%   1 cup   300  Cheese   1 oz    195-335   Cottage cheese 2%  1 cup   155  Fruits  Orange   1 medium  60  Pear    1  medium  19  Raisins    1/4 cup  18  Vegetables-fresh and/or cooked  Broccoli   1/2 cup  36  Bok Mara   1/2 cup  79  Lester greens   1/2 cup  15  Carrots    1 medium  19  Iceberg lettuce  4 leaves  16  Nuts, Beans, Seeds  Canned baked beans   1/2 cup  100  Canned red kidney beans 1/2 cup  25-80  Canned navy beans  1/2 cup  64  Tofu with calcium sulfate  1/2 cup  150  Soybeans   1 cup   175  Soy milk    1 cup   10  Almonds    1/4 cup  74  Protein  [place]   3 oz   181  Tuna, canned   3 oz   10  Turkey breast   3.5 oz   10  Egg (large)   1 egg   27  Peanut Butter   2 tbsp   11  Beef     3 oz   9  Chicken   1 leg   15  Grain  Amaranth (uncooked)  1 cup   298  Corn tortilla    1 medium  42  Rice (cooked)   1/2 cup  20  Waffle (frozen ~7in)  1   179  Bagel    1   23  Calcium fortified foods/drinks  Orange juice   1 cup   300  Cereal + milk   3/4 cup + 1/2 cup 400  Rice milk   1 cup   300  Lactaid milk    1 cup       GESTATIONAL DIABETES SCREENING    All pregnant women are screened at least once for diabetes as part of their prenatal care. A woman has gestational diabetes if she has high blood sugars for the first time during pregnancy.      Diabetes can harm your health and the health of your baby.  But if we find the diabetes early in pregnancy we will watch your health closely and prevent further problems.       We will check for gestational diabetes during your visit between 24-28 weeks visit. Please note you can not do this prior to 24 weeks of gestation.      Plan to spend about an hour at the clinic.  When you check in let us know that you will be having your diabetes screening that day.       We will give you a 50 gram glucose drink that you have 5 minutes to consume.  Exactly one hour later you will have draw blood from your arm to check your blood sugar level.      We will call you to let you know if your results are normal.  If the results are normal no more testing will be needed.  If your results are not  normal we will discuss follow up testing with you.        You may eat prior to the testing but it is not recommended to eat or drink very sweet things such as pop, juice, candy or dessert type foods.  Eat a high protein, low carb meals prior to testing.    If you have any questions please call:    [facility]    [phone]

## 2022-01-05 NOTE — PROGRESS NOTES
Feels well overall. Here alone today.   Fetal movement: positive   Discussed weight gain - she is worried. Reviewed healthy weight gain is between 24-35 lbs. But it is not healthy to lose weight in pregnancy. She has been eating healthy (lots of salads). I am not concerned if weight gain goes above 35 lbs as long as it is not excessive. She states she is relieved to hear that.   Denies loss of fluid/vb/contractions  GCT next visit, handout provided, reminded of longer appointment  Tdap next visit; reviewed CDC recommendations and partner/family vaccination recommended as well  Need for Rhogam? No; O+    Return to clinic 4 weeks    Keagan Rodrigues, DOMENIC, APRN, CNM

## 2022-01-18 DIAGNOSIS — Z36.9 ENCOUNTER FOR ANTENATAL SCREENING OF MOTHER: Primary | ICD-10-CM

## 2022-01-18 DIAGNOSIS — Z23 NEED FOR TDAP VACCINATION: ICD-10-CM

## 2022-01-23 PROBLEM — O09.92 SUPERVISION OF HIGH RISK PREGNANCY IN SECOND TRIMESTER: Status: ACTIVE | Noted: 2021-09-16

## 2022-01-23 NOTE — PROGRESS NOTES
"Feels well overall, reports some back and hip pain after being on her feet all day at work. Working on lining up FMLA leave with work, requesting pregnancy documentation today for this purpose. Noted some \"brain fog\", couldn't remember her zip code this AM. Indicated that her mood is stable, has some concerns about postpartum anxiety considering her history of anxiety disorder; in contact with her psychiatrist.    Fetal movement: positive, denies loss of fluid/vb/contractions  GCT, CBC drawn today  Tdap given: Yes  Rhogam: No  Anti Treponema drawn: Yes  Hep C drawn: Done previously. Non-reactive  Water birth information reviewed: Not interested, but understands it is an option.     Reviewed PTL precautions and S&S of PIH, patient verbalizes understanding and what to report  Discussed increased risk for PPD/PPA in people with history of depression/anxiety, encouraged to continue contact with psychiatrist and bring up any concerns to us if her mood declines or anxiety increased.   Advised of pregnancy support belt for help with back pain, along with baths, ice/heat and rest.   Discussed chiropractic care as adjunct therapy that can be helpful for some, advised of limited insurance coverage at some clinics.   Hospital Registration reminder-online  Confirmation of pregnancy letter provided    Return to clinic 2 weeks    Zelda Lynn, Student Nurse Midwife  [facility]    I was present with the student who participated in the service and in the documentation of the note. I saw and evaluated the patient and agree with the findings and plan of care as documented in the note.      Elyssa GLASGOW CNM                                "

## 2022-01-23 NOTE — PATIENT INSTRUCTIONS

## 2022-01-25 ENCOUNTER — PRENATAL OFFICE VISIT (OUTPATIENT)
Dept: MIDWIFE SERVICES | Facility: CLINIC | Age: 27
End: 2022-01-25
Payer: COMMERCIAL

## 2022-01-25 ENCOUNTER — LAB (OUTPATIENT)
Dept: LAB | Facility: CLINIC | Age: 27
End: 2022-01-25
Payer: COMMERCIAL

## 2022-01-25 VITALS — WEIGHT: 184 LBS | BODY MASS INDEX: 26.4 KG/M2 | SYSTOLIC BLOOD PRESSURE: 108 MMHG | DIASTOLIC BLOOD PRESSURE: 54 MMHG

## 2022-01-25 DIAGNOSIS — Z36.9 ENCOUNTER FOR ANTENATAL SCREENING OF MOTHER: ICD-10-CM

## 2022-01-25 DIAGNOSIS — O09.92 SUPERVISION OF HIGH RISK PREGNANCY IN SECOND TRIMESTER: Primary | ICD-10-CM

## 2022-01-25 DIAGNOSIS — Z23 NEED FOR TDAP VACCINATION: ICD-10-CM

## 2022-01-25 DIAGNOSIS — O09.92 SUPERVISION OF HIGH RISK PREGNANCY IN SECOND TRIMESTER: ICD-10-CM

## 2022-01-25 DIAGNOSIS — Z3A.27 27 WEEKS GESTATION OF PREGNANCY: ICD-10-CM

## 2022-01-25 LAB
ERYTHROCYTE [DISTWIDTH] IN BLOOD BY AUTOMATED COUNT: 12.7 % (ref 10–15)
GLUCOSE 1H P 50 G GLC PO SERPL-MCNC: 101 MG/DL (ref 70–129)
HCT VFR BLD AUTO: 32.9 % (ref 35–47)
HGB BLD-MCNC: 10.5 G/DL (ref 11.7–15.7)
MCH RBC QN AUTO: 29.7 PG (ref 26.5–33)
MCHC RBC AUTO-ENTMCNC: 31.9 G/DL (ref 31.5–36.5)
MCV RBC AUTO: 93 FL (ref 78–100)
PLATELET # BLD AUTO: 187 10E3/UL (ref 150–450)
RBC # BLD AUTO: 3.54 10E6/UL (ref 3.8–5.2)
WBC # BLD AUTO: 9.7 10E3/UL (ref 4–11)

## 2022-01-25 PROCEDURE — 82950 GLUCOSE TEST: CPT

## 2022-01-25 PROCEDURE — 85027 COMPLETE CBC AUTOMATED: CPT

## 2022-01-25 PROCEDURE — 86780 TREPONEMA PALLIDUM: CPT

## 2022-01-25 PROCEDURE — 99207 PR PRENATAL VISIT: CPT | Performed by: ADVANCED PRACTICE MIDWIFE

## 2022-01-25 PROCEDURE — 36415 COLL VENOUS BLD VENIPUNCTURE: CPT

## 2022-01-25 PROCEDURE — 90715 TDAP VACCINE 7 YRS/> IM: CPT | Performed by: ADVANCED PRACTICE MIDWIFE

## 2022-01-25 PROCEDURE — 90471 IMMUNIZATION ADMIN: CPT | Performed by: ADVANCED PRACTICE MIDWIFE

## 2022-01-25 NOTE — LETTER
[facility] Site: {[facility]}    Name: David Clement  [address] [place] [address]  [phone] (home)   YOB: 1995    Patient's last menstrual period was 07/17/2021.    First Day of the 3rd Trimester: 01/29/2022    Expected Date of Delivery: Apr 23, 2022            Provider Signature:

## 2022-01-25 NOTE — NURSING NOTE
Prior to immunization administration, verified patients identity using patient s name and date of birth. Please see Immunization Activity for additional information.     Screening Questionnaire for Adult Immunization    Are you sick today?   No   Do you have allergies to medications, food, a vaccine component or latex?   No   Have you ever had a serious reaction after receiving a vaccination?   No   Do you have a long-term health problem with heart, lung, kidney, or metabolic disease (e.g., diabetes), asthma, a blood disorder, no spleen, complement component deficiency, a cochlear implant, or a spinal fluid leak?  Are you on long-term aspirin therapy?   No   Do you have cancer, leukemia, HIV/AIDS, or any other immune system problem?   No   Do you have a parent, brother, or sister with an immune system problem?   No   In the past 3 months, have you taken medications that affect  your immune system, such as prednisone, other steroids, or anticancer drugs; drugs for the treatment of rheumatoid arthritis, Crohn s disease, or psoriasis; or have you had radiation treatments?   No   Have you had a seizure, or a brain or other nervous system problem?   No   During the past year, have you received a transfusion of blood or blood    products, or been given immune (gamma) globulin or antiviral drug?   No   For women: Are you pregnant or is there a chance you could become       pregnant during the next month?   Yes   Have you received any vaccinations in the past 4 weeks?   No     Immunization questionnaire answers were all negative.        Per orders of Elyssa GLASGOW CNM, injection of Tdap given by Claire Bailon LPN. Patient instructed to remain in clinic for 15 minutes afterwards, and to report any adverse reaction to me immediately.       Screening performed by Claire Bailon LPN on 1/25/2022 at 9:38 AM.

## 2022-01-26 LAB — T PALLIDUM AB SER QL: NONREACTIVE

## 2022-02-03 NOTE — PROGRESS NOTES
Feels okay, some nausea with iron in prenatal was previously taking gummies, taking at night.   Feels anxious about labor, starting birth classes in a little while  Fetal Movement: positive, denies loss of fluid/vb, no contractions  Fetal kick counts reviewed and handout given  Reviewed PTL precautions and s/sx of preeclampsia; denies any S&S and aware of what to report  Discussed trying to take with food, may try liquid iron or separate supplement, iron fish, can send rx if desired, options sent via [facility], consider rechecking CBC at next visit  Return to clinic in 2 weeks    PEE Cotton, CNM

## 2022-02-04 PROBLEM — O09.93 SUPERVISION OF HIGH RISK PREGNANCY IN THIRD TRIMESTER: Status: ACTIVE | Noted: 2021-09-16

## 2022-02-07 ENCOUNTER — PRENATAL OFFICE VISIT (OUTPATIENT)
Dept: MIDWIFE SERVICES | Facility: CLINIC | Age: 27
End: 2022-02-07
Payer: COMMERCIAL

## 2022-02-07 VITALS — SYSTOLIC BLOOD PRESSURE: 104 MMHG | WEIGHT: 187.2 LBS | DIASTOLIC BLOOD PRESSURE: 58 MMHG | BODY MASS INDEX: 26.86 KG/M2

## 2022-02-07 DIAGNOSIS — O09.93 SUPERVISION OF HIGH RISK PREGNANCY IN THIRD TRIMESTER: Primary | ICD-10-CM

## 2022-02-07 DIAGNOSIS — O99.019 ANTEPARTUM ANEMIA COMPLICATING PREGNANCY: ICD-10-CM

## 2022-02-07 DIAGNOSIS — Z3A.29 29 WEEKS GESTATION OF PREGNANCY: ICD-10-CM

## 2022-02-07 DIAGNOSIS — F41.1 GAD (GENERALIZED ANXIETY DISORDER): ICD-10-CM

## 2022-02-07 PROCEDURE — 99207 PR PRENATAL VISIT: CPT | Performed by: ADVANCED PRACTICE MIDWIFE

## 2022-02-07 NOTE — PATIENT INSTRUCTIONS
Floradix-liquid iron  Ferrous gluconate-easier on tummy/less elemental iron  Ferrous sulfate-more iron, can be constipating  Take with vitamin C/vitamin b12 for best absorption  If you wish us to send in an rx let us know!    Fetal Kick Counts    It is important to know when your baby's movements occur. We often get busy with work and life and do not pay close attention to their movements.        Women typically begin feeling movement between 18-22 weeks of gestation, sometimes it can be earlier or later depending on where your placenta is       Movements usually begin feeling like popping or fluttering and as the baby grows they become more pronounced    Toward the end of pregnancy as the baby gets larger they may not move as much or make as big of movements. Babies have maturing sleep cycles as well as not as much room to move and flip. If you are ever concerned about your baby's movements or have not felt the baby move for a while, we recommend you do a fetal kick count. Prior to starting your count drink a glass of water or juice and eat a snack. Then lay down on your side and begin to count movements.     How to do a Fetal Kick Counts    There are many different ways to monitor your baby's movements. Movements can range from large jabs to small kicks, or wiggles.  Hiccups count!      Count 10 movements in 2 hours when resting and focusing    Count 10 movements in 12 hours when doing normal activity    We recommend that if movements occur but seem decreased that you should be seen in the clinic or hospital for evaluation within 12 hours. If fetal movement is absent or fetal kick counts are low please contact us right away.    If you ever have any concerns about your baby's movements DO NOT HESITATE to call us, we are here for you!    [facility]  [phone]    SIGNS OF  LABOR    Labor is  if it happens more than three weeks before your due date.    It can be hard to know if  you are in labor, since the symptoms can be like the normal feelings of pregnancy.  Often, the only difference is the symptoms increase or they don't go away.     Signs of  labor can include:      Contractions which can feel like period cramps or gas pain.  You may feel it in the lower part of your abdomen, in your back, or as a pressure feeling in your bottom.  It is often regular, coming every 5 or 10 minutes, and  lasting about 30-60 seconds. Some contractions are normal during pregnancy ([place] thompson contractions) but if you are feeling more than 5-6 in one hour that is NOT normal    If this occurs empty your bladder, then drink 2-3 glasses of water, eat a snack, and lay down on your left side. Put your hand on your abdomen to count the contractions.  If after one hour of resting you have still had 5-6 contractions call your clinic right away.      If you feel a pop, gush, or trickle of fluid it may mean that your bag of water has broken and you should contact the clinic       You may also experience loose stools and/or rectal pressure       Listen to your body, if something doesn't seem right please call us at the clinic    Risk Factors      Previous  delivery    Bacterial Vaginosis- if you notice a fishy smell to your discharge or experience vaginal itching/discomfort you should be evaluated for infection    Smoking    Drug abuse    Adolescent (teen) pregnancy or advanced maternal age (AMA) age 35 and over    Dehydration (this may not cause  labor but it can cause contractions)    If you think you are in  labor we may do some lab testing in the clinic or send you to the hospital for evaluation    Please call us if you are concerned you are in  labor.    [facility]  [phone]

## 2022-02-25 NOTE — PROGRESS NOTES
"Feels well, no concerns, just a few questions. Has work trip in 2 weeks, traveling to [place] for a conference @ 34w; will bring records. Mom is also joining her. Also wondering about when mask mandates lift if she should continue  Recommend she check [employer] website, last week 80% of americans could \"go without masks inside\" but immune compromised which would include pregnant women still should wear masks, she should see how she feels  Fetal Movement: positive, denies loss of fluid/vb, no contractions  Fetal kick counts/movement reviewed  Reviewed PTL precautions and s/sx of preeclampsia; denies any S&S and aware of what to report  Peds chosen: No-handout given for [place] [facility], discussed [facility] and Peds in [place]  Plans to BF   Rx given for breast pump, discussed [facility] or milk mom[facility]   Measuring the same the last two time, small for dates, growth US ordered.  Return to clinic 2 weeks    PEE Cotton, CNM                "

## 2022-02-28 ENCOUNTER — ANCILLARY PROCEDURE (OUTPATIENT)
Dept: ULTRASOUND IMAGING | Facility: CLINIC | Age: 27
End: 2022-02-28
Attending: ADVANCED PRACTICE MIDWIFE
Payer: COMMERCIAL

## 2022-02-28 ENCOUNTER — PRENATAL OFFICE VISIT (OUTPATIENT)
Dept: MIDWIFE SERVICES | Facility: CLINIC | Age: 27
End: 2022-02-28
Payer: COMMERCIAL

## 2022-02-28 VITALS — WEIGHT: 191 LBS | BODY MASS INDEX: 27.41 KG/M2 | DIASTOLIC BLOOD PRESSURE: 62 MMHG | SYSTOLIC BLOOD PRESSURE: 98 MMHG

## 2022-02-28 DIAGNOSIS — O09.93 SUPERVISION OF HIGH RISK PREGNANCY IN THIRD TRIMESTER: Primary | ICD-10-CM

## 2022-02-28 DIAGNOSIS — F41.1 GAD (GENERALIZED ANXIETY DISORDER): ICD-10-CM

## 2022-02-28 DIAGNOSIS — O09.93 SUPERVISION OF HIGH RISK PREGNANCY IN THIRD TRIMESTER: ICD-10-CM

## 2022-02-28 DIAGNOSIS — Z3A.32 32 WEEKS GESTATION OF PREGNANCY: ICD-10-CM

## 2022-02-28 PROCEDURE — 76816 OB US FOLLOW-UP PER FETUS: CPT | Performed by: OBSTETRICS & GYNECOLOGY

## 2022-02-28 PROCEDURE — 99207 PR PRENATAL VISIT: CPT | Performed by: ADVANCED PRACTICE MIDWIFE

## 2022-03-10 NOTE — PATIENT INSTRUCTIONS
SIGNS OF  LABOR    Labor is  if it happens more than three weeks before your due date.    It can be hard to know if you are in labor, since the symptoms can be like the normal feelings of pregnancy.  Often, the only difference is the symptoms increase or they don't go away.     Signs of  labor can include:      Contractions which can feel like period cramps or gas pain.  You may feel it in the lower part of your abdomen, in your back, or as a pressure feeling in your bottom.  It is often regular, coming every 5 or 10 minutes, and  lasting about 30-60 seconds. Some contractions are normal during pregnancy ([place] thompson contractions) but if you are feeling more than 5-6 in one hour that is NOT normal    If this occurs empty your bladder, then drink 2-3 glasses of water, eat a snack, and lay down on your left side. Put your hand on your abdomen to count the contractions.  If after one hour of resting you have still had 5-6 contractions call your clinic right away.      If you feel a pop, gush, or trickle of fluid it may mean that your bag of water has broken and you should contact the clinic       You may also experience loose stools and/or rectal pressure       Listen to your body, if something doesn't seem right please call us at the clinic    Risk Factors      Previous  delivery    Bacterial Vaginosis- if you notice a fishy smell to your discharge or experience vaginal itching/discomfort you should be evaluated for infection    Smoking    Drug abuse    Adolescent (teen) pregnancy or advanced maternal age (AMA) age 35 and over    Dehydration (this may not cause  labor but it can cause contractions)    If you think you are in  labor we may do some lab testing in the clinic or send you to the hospital for evaluation    Please call us if you are concerned you are in  labor.    [facility]  [phone]    PREECLAMPSIA SIGNS AND  "SYMPTOMS    Preeclampsia is a dangerous condition that some women develop in the second half of pregnancy. It can also begin after the baby is born.  Preeclampsia causes high blood pressure and can cause problems with many organ systems in your body.  It can also affect the growth of your baby. The exact cause of preeclampsia is unknown, however, there are signs and symptoms to watch for:    -A bad headache that doesn't improve with Tylenol  -Visual changes such as spots, flashes of light, blurry vision  -Pain in the upper right part of your abdomen, especially under the ribs that doesn't go away  -Nausea and/or vomiting  -Feeling extremely tired  -Yellowing of the skin and/or eyes  -Feeling \"not quite right\" or that something is wrong  -An extreme amount of swelling (some swelling in pregnancy is very normal)    If your midwife feels that you are developing preeclampsia, you will have lab tests drawn and will be monitored very closely.     If you are experiencing anyof these symptoms, call the [facility] immediately at [phone].    Fetal Kick Counts    It is important to know when your baby's movements occur. We often get busy with work and life and do not pay close attention to their movements.        Women typically begin feeling movement between 18-22 weeks of gestation, sometimes it can be earlier or later depending on where your placenta is       Movements usually begin feeling like popping or fluttering and as the baby grows they become more pronounced    Toward the end of pregnancy as the baby gets larger they may not move as much or make as big of movements. Babies have maturing sleep cycles as well as not as much room to move and flip. If you are ever concerned about your baby's movements or have not felt the baby move for a while, we recommend you do a fetal kick count. Prior to starting your count drink a glass of water or juice and eat a snack. Then lay down on your side and " begin to count movements.     How to do a Fetal Kick Counts    There are many different ways to monitor your baby's movements. Movements can range from large jabs to small kicks, or wiggles.  Hiccups count!      Count 10 movements in 2 hours when resting and focusing    Count 10 movements in 12 hours when doing normal activity    We recommend that if movements occur but seem decreased that you should be seen in the clinic or hospital for evaluation within 12 hours. If fetal movement is absent or fetal kick counts are low please contact us right away.    If you ever have any concerns about your baby's movements DO NOT HESITATE to call us, we are here for you!    [facility]  [phone]        GROUP B STREP    Group B Strep (GBS) is a common bacteria that is sometimes found in the vagina, urinary tract or rectum.  It is not harmful typically to adults but can cause serious illness in newborns.  It occasionally is passed from mother to baby during birth.   It is important to test in pregnancy.  When a woman is found to be positive for GBS, either at the first prenatal visit or by taking a culture at 36 weeks, treatment will be offered to reduce the chance of spreading the bacteria to the baby.       Treatment consists of either oral antibiotics early in pregnancy or antibiotics given by IV during labor if testing is positive at 36 weeks.      Even without treatment the baby rarely (1-2% of the time) gets infected.  With treatment the baby almost never gets infected.       There really isn't anything you can do to keep from getting or being positive for GBS.  It isn't sexually transmitted and there are no symptoms if you are positive.       Your midwife will discuss your results with you and make recommendations for treatment.                                                     Infant Car Seat Safety   [place] law requires that all infants must be secured into a car seat while in a car.     General Car Seat Guidelines:            Your car seat should be no more than six years old and should never have been in an accident.  Always know the history of your car seat.            Car seats should be installed in the back seat and should be rear-facing. The American Academy of Pediatrics recommends that all infants and toddlers should remain rear-facing for as long as possible, until they reach the weight or height limits allowed by their car seat. Follow your car seat 's instructions.            The straps on the car seat should be snug enough that you should not be able to pinch up slack with your fingers.            Never dress your baby in thick clothing, coats or blankets while riding in a car seat.  Secure the baby into the seat wearing clothing of normal thickness and then cover baby s lap with a blanket OVER the car seat straps, if needed.  Car Seat Clinics and Safety Information:            Schedule an appointment to have a professional check the installation of your car seat before your baby is born.    Go to: https://[place]ps.[place].gov/divisions/ots/child-passenger-safety/Pages/car-seat-checks.aspx         and click on your county.            Sign up for email alerts on child restraint/car seat recalls at:     http://www-rodrigo.nhtsa.dot.gov/subscriptions/index.cfm             Find more information on car seat safety at:     http://www.safercar.gov/parents/CarSeats/Car-Seat-Safety.htm

## 2022-03-14 ENCOUNTER — PRENATAL OFFICE VISIT (OUTPATIENT)
Dept: MIDWIFE SERVICES | Facility: CLINIC | Age: 27
End: 2022-03-14
Payer: COMMERCIAL

## 2022-03-14 VITALS — BODY MASS INDEX: 28.12 KG/M2 | SYSTOLIC BLOOD PRESSURE: 102 MMHG | DIASTOLIC BLOOD PRESSURE: 64 MMHG | WEIGHT: 196 LBS

## 2022-03-14 DIAGNOSIS — O09.93 SUPERVISION OF HIGH RISK PREGNANCY IN THIRD TRIMESTER: ICD-10-CM

## 2022-03-14 DIAGNOSIS — Z3A.34 34 WEEKS GESTATION OF PREGNANCY: ICD-10-CM

## 2022-03-14 DIAGNOSIS — O99.019 ANTEPARTUM ANEMIA COMPLICATING PREGNANCY: Primary | ICD-10-CM

## 2022-03-14 DIAGNOSIS — F41.1 GAD (GENERALIZED ANXIETY DISORDER): ICD-10-CM

## 2022-03-14 PROCEDURE — 99207 PR PRENATAL VISIT: CPT | Performed by: ADVANCED PRACTICE MIDWIFE

## 2022-03-16 ENCOUNTER — MYC MEDICAL ADVICE (OUTPATIENT)
Dept: MIDWIFE SERVICES | Facility: CLINIC | Age: 27
End: 2022-03-16
Payer: COMMERCIAL

## 2022-03-16 DIAGNOSIS — O21.9 NAUSEA AND VOMITING IN PREGNANCY: ICD-10-CM

## 2022-03-16 RX ORDER — METOCLOPRAMIDE 5 MG/1
5-10 TABLET ORAL 4 TIMES DAILY PRN
Qty: 56 TABLET | Refills: 0 | Status: ON HOLD | OUTPATIENT
Start: 2022-03-16 | End: 2022-05-01

## 2022-03-26 NOTE — PATIENT INSTRUCTIONS
"Labor Instructions for Midwife Patients    When to call:  Both during and after office hours call  [phone]. There is a triage RN to take your calls and answer your questions 24 hours a day.  If they cannot answer your question they will page the midwife on call for you.    When to call:  Call anytime you have important concerns about you or your baby.     Call if:    You are having contractions at regular intervals about 5-6 minutes apart lasting 60 seconds and becoming increasingly more intense     You have an uncontrollable gush of fluid from your vagina or feel a pop and gush like your water has broken    You have HEAVY bleeding, like heavy period, blood running down your legs, or  soaking a pad.     Some bleeding after a pelvic exam, after intercourse, or in labor when your cervix is dilating is normal and is referred to as \"bloody show\"    You have severe, continuous back or abdominal pain    You feel it is time to go to the hospital    If this is your first labor, call when contractions are very intense and have been about every 3-4 minutes for about an hour    If it is your second labor or more, call when contractions are strong and about every 3-5 minutes or sooner depending on your level of discomfort.     Keep in mind we are always here for you! If you have questions, concerns please don't hesitate to call us.     What to eat/drink in labor: Drink plenty of fluid (water most importantly, juice, soda or tea without caffeine). Eat rice, pasta, soup, cereal, bread/toast, and fruit. Avoid dairy and greasy food as they are difficult to digest and you may experience some nausea during labor.    Comfort measures:    Baths and showers (ok even with ruptured membranes, it may temporarily slow contractions if you are still in the early stage of labor)    Warm/hot packs for back pain or discomfort    Back, belly, or thigh massages    Standing, rocking, walking, leaning over bed or tables, side-lying and " sleeping    Miscellaneous:     Contractions are timed from the beginning of one to the beginning of the next    Try hard to sleep during the early stage of labor when you are not that uncomfortable. Timing of contractions at this point is not important    Even if you cannot sleep, resting in bed or on the couch can help you maintain your energy for labor    When you arrive at the hospital the nurse will check your baby's heartbeat, check your cervix, and will call us. The midwife on call will come in and be with you when you are in active labor    After hours you need to enter the hospital through the emergency room    Fetal Kick Counts    It is important to know when your baby's movements occur. We often get busy with work and life and do not pay close attention to their movements.        Women typically begin feeling movement between 18-22 weeks of gestation, sometimes it can be earlier or later depending on where your placenta is       Movements usually begin feeling like popping or fluttering and as the baby grows they become more pronounced    Toward the end of pregnancy as the baby gets larger they may not move as much or make as big of movements. Babies have maturing sleep cycles as well as not as much room to move and flip. If you are ever concerned about your baby's movements or have not felt the baby move for a while, we recommend you do a fetal kick count. Prior to starting your count drink a glass of water or juice and eat a snack. Then lay down on your side and begin to count movements.     How to do a Fetal Kick Counts    There are many different ways to monitor your baby's movements. Movements can range from large jabs to small kicks, or wiggles.  Hiccups count!      Count 10 movements in 2 hours when resting and focusing    Count 10 movements in 12 hours when doing normal activity    We recommend that if movements occur but seem decreased that you should be seen in the clinic or hospital for  "evaluation within 12 hours. If fetal movement is absent or fetal kick counts are low please contact us right away.    If you ever have any concerns about your baby's movements DO NOT HESITATE to call us, we are here for you!    [facility] for Women  [phone]        PREECLAMPSIA SIGNS AND SYMPTOMS    Preeclampsia is a dangerous condition that some women develop in the second half of pregnancy. It can also begin after the baby is born.  Preeclampsia causes high blood pressure and can cause problems with many organ systems in your body.  It can also affect the growth of your baby. The exact cause of preeclampsia is unknown, however, there are signs and symptoms to watch for:    -A bad headache that doesn't improve with Tylenol  -Visual changes such as spots, flashes of light, blurry vision  -Pain in the upper right part of your abdomen, especially under the ribs that doesn't go away  -Nausea and/or vomiting  -Feeling extremely tired  -Yellowing of the skin and/or eyes  -Feeling \"not quite right\" or that something is wrong  -An extreme amount of swelling (some swelling in pregnancy is very normal)    If your midwife feels that you are developing preeclampsia, you will have lab tests drawn and will be monitored very closely.     If you are experiencing anyof these symptoms, call the [facility] immediately at [phone].    "

## 2022-03-26 NOTE — PROGRESS NOTES
Feels well overall. Here alone   Fetal movement: positive  Denies bleeding/lof, no contractions  GBS swab done today  BSUS showed fetal position vertex, slightly to maternal left.   Cx:  Declined    Labor instructions given  Labor preferences/birth plan reviewed: plans epidural   Breast pump Rx: no, has one already   CBC drawn today: yes     Warning signs reviewed  Patient denies S&S of pre-eclampsia and aware of what to report   Return to clinic 1 week    PEE Gamez, CNM    *recheck fetal position next visit

## 2022-03-28 ENCOUNTER — PRENATAL OFFICE VISIT (OUTPATIENT)
Dept: MIDWIFE SERVICES | Facility: CLINIC | Age: 27
End: 2022-03-28
Payer: COMMERCIAL

## 2022-03-28 VITALS — WEIGHT: 197 LBS | SYSTOLIC BLOOD PRESSURE: 94 MMHG | DIASTOLIC BLOOD PRESSURE: 62 MMHG | BODY MASS INDEX: 28.27 KG/M2

## 2022-03-28 DIAGNOSIS — O99.019 ANTEPARTUM ANEMIA COMPLICATING PREGNANCY: ICD-10-CM

## 2022-03-28 DIAGNOSIS — O09.93 SUPERVISION OF HIGH RISK PREGNANCY IN THIRD TRIMESTER: Primary | ICD-10-CM

## 2022-03-28 DIAGNOSIS — Z3A.36 36 WEEKS GESTATION OF PREGNANCY: ICD-10-CM

## 2022-03-28 DIAGNOSIS — F41.1 GAD (GENERALIZED ANXIETY DISORDER): ICD-10-CM

## 2022-03-28 DIAGNOSIS — Z36.85 SCREENING, ANTENATAL, FOR STREPTOCOCCUS B: ICD-10-CM

## 2022-03-28 LAB
ERYTHROCYTE [DISTWIDTH] IN BLOOD BY AUTOMATED COUNT: 13.1 % (ref 10–15)
HCT VFR BLD AUTO: 32 % (ref 35–47)
HGB BLD-MCNC: 9.9 G/DL (ref 11.7–15.7)
MCH RBC QN AUTO: 28.5 PG (ref 26.5–33)
MCHC RBC AUTO-ENTMCNC: 30.9 G/DL (ref 31.5–36.5)
MCV RBC AUTO: 92 FL (ref 78–100)
PLATELET # BLD AUTO: 182 10E3/UL (ref 150–450)
RBC # BLD AUTO: 3.47 10E6/UL (ref 3.8–5.2)
WBC # BLD AUTO: 9 10E3/UL (ref 4–11)

## 2022-03-28 PROCEDURE — 99207 PR PRENATAL VISIT: CPT | Performed by: ADVANCED PRACTICE MIDWIFE

## 2022-03-28 PROCEDURE — 36415 COLL VENOUS BLD VENIPUNCTURE: CPT | Performed by: ADVANCED PRACTICE MIDWIFE

## 2022-03-28 PROCEDURE — 87653 STREP B DNA AMP PROBE: CPT | Performed by: ADVANCED PRACTICE MIDWIFE

## 2022-03-28 PROCEDURE — 85027 COMPLETE CBC AUTOMATED: CPT | Performed by: ADVANCED PRACTICE MIDWIFE

## 2022-03-29 LAB — GP B STREP DNA SPEC QL NAA+PROBE: NEGATIVE

## 2022-04-03 NOTE — PATIENT INSTRUCTIONS
"Labor Instructions for Midwife Patients    When to call:  Both during and after office hours call  [phone]. There is a triage RN to take your calls and answer your questions 24 hours a day.  If they cannot answer your question they will page the midwife on call for you.    When to call:  Call anytime you have important concerns about you or your baby.     Call if:    You are having contractions at regular intervals about 5-6 minutes apart lasting 30-60 seconds and becoming increasingly more intense     You have an uncontrollable gush of fluid from your vagina or feel a pop and gush like your water has broken    You have HEAVY bleeding, like heavy period, blood running down your legs, or  soaking a pad.     Some bleeding after a pelvic exam, after intercourse, or in labor when your cervix is dilating is normal and is referred to as \"bloody show\"    You have severe, continuous back or abdominal pain    You feel it is time to go to the hospital    If this is your first labor, call when contractions are very intense and have been about every 3-4 minutes for about an hour    If it is your second labor or more, call when contractions are strong and about every 3-5 minutes or sooner depending on your level of discomfort.     Keep in mind we are always here for you! If you have questions, concerns please don't hesitate to call us.     What to eat/drink in labor: Drink plenty of fluid (water most importantly, juice, soda or tea without caffeine). Eat rice, pasta, soup, cereal, bread/toast, and fruit. Avoid dairy and greasy food as they are difficult to digest and you may experience some nausea during labor.    Comfort measures:    Baths and showers (ok even with ruptured membranes, it may temporarily slow contractions if you are still in the early stage of labor)    Warm/hot packs for back pain or discomfort    Back, belly, or thigh massages    Standing, rocking, walking, leaning over bed or tables, side-lying and " sleeping    Miscellaneous:     Contractions are timed from the beginning of one to the beginning of the next    Try hard to sleep during the early stage of labor when you are not that uncomfortable. Timing of contractions at this point is not important    Even if you cannot sleep, resting in bed or on the couch can help you maintain your energy for labor    When you arrive at the hospital the nurse will check your baby's heartbeat, check your cervix, and will call us. The midwife on call will come in and be with you when you are in active labor    After hours you need to enter the hospital through the emergency room              Post Dates Management    If you've gone beyond your due date you are probably thinking when is this baby coming!  Most babies are born on or after their due date so it is nothing to worry about.    If you are pregnant at 40 weeks we will start some increased monitoring to make sure that your baby and the placenta are healthy enough to continue your pregnancy.      We would have you come to the clinic every 3-4 days to be assessed with an ultrasound called a Biophysical profile (BPP).       This tells us how your baby is doing. We monitor fetal movement, breathing patterns, amniotic fluid level, and heart rate.     Research has shown that by 42 weeks gestation the placenta is not always healthy enough to support the pregnancy further and it is better for the baby to be born.  If you are still pregnant by 41 and a half weeks we will discuss induction of labor with you and the different options available.       PREECLAMPSIA SIGNS AND SYMPTOMS    Preeclampsia is a dangerous condition that some women develop in the second half of pregnancy. It can also begin after the baby is born.  Preeclampsia causes high blood pressure and can cause problems with many organ systems in your body.  It can also affect the growth of your baby. The exact cause of preeclampsia is unknown, however, there are signs  "and symptoms to watch for:    -A bad headache that doesn't improve with Tylenol  -Visual changes such as spots, flashes of light, blurry vision  -Pain in the upper right part of your abdomen, especially under the ribs that doesn't go away  -Nausea and/or vomiting  -Feeling extremely tired  -Yellowing of the skin and/or eyes  -Feeling \"not quite right\" or that something is wrong  -An extreme amount of swelling (some swelling in pregnancy is very normal)    If your midwife feels that you are developing preeclampsia, you will have lab tests drawn and will be monitored very closely.     If you are experiencing anyof these symptoms, call the [facility] immediately at [phone].    Fetal Kick Counts    It is important to know when your baby's movements occur. We often get busy with work and life and do not pay close attention to their movements.        Women typically begin feeling movement between 18-22 weeks of gestation, sometimes it can be earlier or later depending on where your placenta is       Movements usually begin feeling like popping or fluttering and as the baby grows they become more pronounced    Toward the end of pregnancy as the baby gets larger they may not move as much or make as big of movements. Babies have maturing sleep cycles as well as not as much room to move and flip. If you are ever concerned about your baby's movements or have not felt the baby move for a while, we recommend you do a fetal kick count. Prior to starting your count drink a glass of water or juice and eat a snack. Then lay down on your side and begin to count movements.     How to do a Fetal Kick Counts    There are many different ways to monitor your baby's movements. Movements can range from large jabs to small kicks, or wiggles.  Hiccups count!      Count 10 movements in 2 hours when resting and focusing    Count 10 movements in 12 hours when doing normal activity    We recommend that if movements occur but " seem decreased that you should be seen in the clinic or hospital for evaluation within 12 hours. If fetal movement is absent or fetal kick counts are low please contact us right away.    If you ever have any concerns about your baby's movements DO NOT HESITATE to call us, we are here for you!    [facility]  [phone]    COMMON MEDICATIONS AFTER GIVING BIRTH    After giving birth, it is common to be prescribed medications that are available over the counter.  It is recommended that you purchase these medications before you have your baby and have at home.  The common medications are:    Colace 100mg (generic name docusate sodium)    Motrin or Advil 200mg (generic name ibuprofen)    Tylenol 325-500mg (generic name acetaminophen)    If you are unable to find the medications or would prefer to have prescriptions sent to a pharmacy, that can be done when you are being discharged from the hospital.  If you have any questions or concerns, please do not hesitate to contact our clinic: [phone].

## 2022-04-03 NOTE — PROGRESS NOTES
Feels well. Sore lower back.   CBC at last visit was 9.9. Has started taking oral iron regimen. Discussed rechecking level in a few weeks or upon admission. Encouraged to take daily on an empty stomach. Use a stool softener, as needed.   Fetal movement: positive  Denies vaginal bleeding/lof, some max thompson contractions  Position check today with handheld BSUS shows baby is vtx  Warning signs reviewed  Labor signs and symptoms discussed, aware of numbers to call.   Denies s/sx of preeclampsia and aware of what to report.  Discussed MyChart check-in, 2-week and 6-week postpartum visits  Plans for birth control after baby: Planning do the patch, but is planning to breastfeed. Discussed Progestin only methods. Will address again.     Return to clinic 1 week    Elyssa GLASGOW CNM

## 2022-04-04 ENCOUNTER — PRENATAL OFFICE VISIT (OUTPATIENT)
Dept: MIDWIFE SERVICES | Facility: CLINIC | Age: 27
End: 2022-04-04
Payer: COMMERCIAL

## 2022-04-04 VITALS — BODY MASS INDEX: 28.27 KG/M2 | SYSTOLIC BLOOD PRESSURE: 98 MMHG | WEIGHT: 197 LBS | DIASTOLIC BLOOD PRESSURE: 62 MMHG

## 2022-04-04 DIAGNOSIS — O99.019 ANTEPARTUM ANEMIA COMPLICATING PREGNANCY: Primary | ICD-10-CM

## 2022-04-04 DIAGNOSIS — O99.343 ANXIETY IN PREGNANCY IN THIRD TRIMESTER, ANTEPARTUM: ICD-10-CM

## 2022-04-04 DIAGNOSIS — F41.9 ANXIETY IN PREGNANCY IN THIRD TRIMESTER, ANTEPARTUM: ICD-10-CM

## 2022-04-04 DIAGNOSIS — O09.93 SUPERVISION OF HIGH RISK PREGNANCY IN THIRD TRIMESTER: ICD-10-CM

## 2022-04-04 DIAGNOSIS — Z3A.37 37 WEEKS GESTATION OF PREGNANCY: ICD-10-CM

## 2022-04-04 PROCEDURE — 99207 PR PRENATAL VISIT: CPT | Performed by: ADVANCED PRACTICE MIDWIFE

## 2022-04-06 NOTE — PROGRESS NOTES
Feels overall. Has had a few episode of dizziness/lightheadedness.   Taking oral iron daily   Fetal movement: positive  Denies vaginal bleeding/lof, some BH contractions  Warning signs reviewed   Labor signs and symptoms discussed, aware of numbers to call  Desires cervix check: closed/thick/high  Denies s/sx of pre-eclampsia and aware of what to report  Code status at hospital: Full code status   Discussed dizziness/lightheadedness may be related to dehydration, low blood sugar, low blood pressure, anemia. Discussed importance of adequate hydration and frequent light healthy snacks. Warning signs discussed.     Return to clinic 1 week    PEE Gamez, CNM

## 2022-04-06 NOTE — PATIENT INSTRUCTIONS
"Labor Instructions for Midwife Patients    When to call:  Both during and after office hours call  [phone]. There is a triage RN to take your calls and answer your questions 24 hours a day.  If they cannot answer your question they will page the midwife on call for you.    When to call:  Call anytime you have important concerns about you or your baby.     Call if:    You are having contractions at regular intervals about 5-6 minutes apart lasting 60 seconds and becoming increasingly more intense     You have an uncontrollable gush of fluid from your vagina or feel a pop and gush like your water has broken    You have HEAVY bleeding, like heavy period, blood running down your legs, or  soaking a pad.     Some bleeding after a pelvic exam, after intercourse, or in labor when your cervix is dilating is normal and is referred to as \"bloody show\"    You have severe, continuous back or abdominal pain    You feel it is time to go to the hospital    If this is your first labor, call when contractions are very intense and have been about every 3-4 minutes for about an hour    If it is your second labor or more, call when contractions are strong and about every 3-5 minutes or sooner depending on your level of discomfort.     Keep in mind we are always here for you! If you have questions, concerns please don't hesitate to call us.     What to eat/drink in labor: Drink plenty of fluid (water most importantly, juice, soda or tea without caffeine). Eat rice, pasta, soup, cereal, bread/toast, and fruit. Avoid dairy and greasy food as they are difficult to digest and you may experience some nausea during labor.    Comfort measures:    Baths and showers (ok even with ruptured membranes, it may temporarily slow contractions if you are still in the early stage of labor)    Warm/hot packs for back pain or discomfort    Back, belly, or thigh massages    Standing, rocking, walking, leaning over bed or tables, side-lying and " "sleeping    Miscellaneous:     Contractions are timed from the beginning of one to the beginning of the next    Try hard to sleep during the early stage of labor when you are not that uncomfortable. Timing of contractions at this point is not important    Even if you cannot sleep, resting in bed or on the couch can help you maintain your energy for labor    When you arrive at the hospital the nurse will check your baby's heartbeat, check your cervix, and will call us. The midwife on call will come in and be with you when you are in active labor    After hours you need to enter the hospital through the emergency room    PREECLAMPSIA SIGNS AND SYMPTOMS    Preeclampsia is a dangerous condition that some women develop in the second half of pregnancy. It can also begin after the baby is born.  Preeclampsia causes high blood pressure and can cause problems with many organ systems in your body.  It can also affect the growth of your baby. The exact cause of preeclampsia is unknown, however, there are signs and symptoms to watch for:    -A bad headache that doesn't improve with Tylenol  -Visual changes such as spots, flashes of light, blurry vision  -Pain in the upper right part of your abdomen, especially under the ribs that doesn't go away  -Nausea and/or vomiting  -Feeling extremely tired  -Yellowing of the skin and/or eyes  -Feeling \"not quite right\" or that something is wrong  -An extreme amount of swelling (some swelling in pregnancy is very normal)    If your midwife feels that you are developing preeclampsia, you will have lab tests drawn and will be monitored very closely.     If you are experiencing anyof these symptoms, call the [facility] immediately at [phone].    Fetal Kick Counts    It is important to know when your baby's movements occur. We often get busy with work and life and do not pay close attention to their movements.        Women typically begin feeling movement between " 18-22 weeks of gestation, sometimes it can be earlier or later depending on where your placenta is       Movements usually begin feeling like popping or fluttering and as the baby grows they become more pronounced    Toward the end of pregnancy as the baby gets larger they may not move as much or make as big of movements. Babies have maturing sleep cycles as well as not as much room to move and flip. If you are ever concerned about your baby's movements or have not felt the baby move for a while, we recommend you do a fetal kick count. Prior to starting your count drink a glass of water or juice and eat a snack. Then lay down on your side and begin to count movements.     How to do a Fetal Kick Counts    There are many different ways to monitor your baby's movements. Movements can range from large jabs to small kicks, or wiggles.  Hiccups count!      Count 10 movements in 2 hours when resting and focusing    Count 10 movements in 12 hours when doing normal activity    We recommend that if movements occur but seem decreased that you should be seen in the clinic or hospital for evaluation within 12 hours. If fetal movement is absent or fetal kick counts are low please contact us right away.    If you ever have any concerns about your baby's movements DO NOT HESITATE to call us, we are here for you!    [facility]  [phone]        COMMON MEDICATIONS AFTER GIVING BIRTH    After giving birth, it is common to be prescribed medications that are available over the counter.  It is recommended that you purchase these medications before you have your baby and have at home.  The common medications are:    Colace 100mg (generic name docusate sodium)    Motrin or Advil 200mg (generic name ibuprofen)    Tylenol 325-500mg (generic name acetaminophen)    If you are unable to find the medications or would prefer to have prescriptions sent to a pharmacy, that can be done when you are being discharged from the  [facility].  If you have any questions or concerns, please do not hesitate to contact our clinic: [phone].

## 2022-04-11 ENCOUNTER — PRENATAL OFFICE VISIT (OUTPATIENT)
Dept: MIDWIFE SERVICES | Facility: CLINIC | Age: 27
End: 2022-04-11
Payer: COMMERCIAL

## 2022-04-11 VITALS — BODY MASS INDEX: 28.98 KG/M2 | SYSTOLIC BLOOD PRESSURE: 102 MMHG | WEIGHT: 202 LBS | DIASTOLIC BLOOD PRESSURE: 66 MMHG

## 2022-04-11 DIAGNOSIS — O99.019 ANTEPARTUM ANEMIA COMPLICATING PREGNANCY: ICD-10-CM

## 2022-04-11 DIAGNOSIS — F41.1 GAD (GENERALIZED ANXIETY DISORDER): ICD-10-CM

## 2022-04-11 DIAGNOSIS — Z3A.38 38 WEEKS GESTATION OF PREGNANCY: ICD-10-CM

## 2022-04-11 DIAGNOSIS — O09.93 SUPERVISION OF HIGH RISK PREGNANCY IN THIRD TRIMESTER: Primary | ICD-10-CM

## 2022-04-11 PROCEDURE — 99207 PR PRENATAL VISIT: CPT | Performed by: ADVANCED PRACTICE MIDWIFE

## 2022-04-18 ENCOUNTER — PRENATAL OFFICE VISIT (OUTPATIENT)
Dept: MIDWIFE SERVICES | Facility: CLINIC | Age: 27
End: 2022-04-18
Payer: COMMERCIAL

## 2022-04-18 VITALS — SYSTOLIC BLOOD PRESSURE: 110 MMHG | BODY MASS INDEX: 29.41 KG/M2 | DIASTOLIC BLOOD PRESSURE: 66 MMHG | WEIGHT: 205 LBS

## 2022-04-18 DIAGNOSIS — F41.1 GAD (GENERALIZED ANXIETY DISORDER): ICD-10-CM

## 2022-04-18 DIAGNOSIS — Z3A.39 39 WEEKS GESTATION OF PREGNANCY: ICD-10-CM

## 2022-04-18 DIAGNOSIS — O99.013 ANEMIA DURING PREGNANCY IN THIRD TRIMESTER: ICD-10-CM

## 2022-04-18 DIAGNOSIS — O09.93 SUPERVISION OF HIGH RISK PREGNANCY IN THIRD TRIMESTER: Primary | ICD-10-CM

## 2022-04-18 PROCEDURE — 99207 PR PRENATAL VISIT: CPT | Performed by: ADVANCED PRACTICE MIDWIFE

## 2022-04-18 NOTE — PATIENT INSTRUCTIONS
"Labor Instructions for Midwife Patients    When to call:  Both during and after office hours call  [phone]. There is a triage RN to take your calls and answer your questions 24 hours a day.  If she cannot answer your question she will page the midwife on call for you.    When to call:  Call anytime you have important concerns about you or your baby.     Call if:  You are having contractions at regular intervals about 5-6 minutes apart lasting 30-60 seconds and becoming increasingly more intense   You have an uncontrollable gush of fluid from your vagina or feel a pop and gush like your water has broken  You have HEAVY bleeding, like heavy period, blood running down your legs, or  soaking a pad.   Some bleeding after a pelvic exam, after intercourse, or in labor when your cervix is dilating is normal and is referred to as \"bloody show\"  You have severe, continuous back or abdominal pain  You feel it is time to go to the hospital  If this is your first labor, call when contractions are very intense and have been about every 3-4 minutes for about an hour  If it is your second labor or more, call when contractions are strong and about every 3-5 minutes or sooner depending on your level of discomfort.     Keep in mind we are always here for you! If you have questions, concerns please don't hesitate to call us.     What to eat/drink in labor: Drink plenty of fluid (water most importantly, juice, soda or tea without caffeine). Eat rice, pasta, soup, cereal, bread/toast, and fruit. Avoid dairy and greasy food as they are difficult to digest and you may experience some nausea during labor.    Comfort measures:  Baths and showers (ok even with ruptured membranes, it may temporarily slow contractions if you are still in the early stage of labor)  Warm/hot packs for back pain or discomfort  Back, belly, or thigh massages  Standing, rocking, walking, leaning over bed or tables, side-lying and sleeping    Miscellaneous: " "  Contractions are timed from the beginning of one to the beginning of the next  Try hard to sleep during the early stage of labor when you are not that uncomfortable. Timing of contractions at this point is not important  Even if you cannot sleep, resting in bed or on the couch can help you maintain your energy for labor  When you arrive at the hospital the nurse will check your baby's heartbeat, check your cervix, and will call us. The midwife on call will come in and be with you when you are in active labor  After hours you need to enter the hospital through the emergency room          PREECLAMPSIA SIGNS AND SYMPTOMS    Preeclampsia is a dangerous condition that some women develop in the second half of pregnancy. It can also begin after the baby is born.  Preeclampsia causes high blood pressure and can cause problems with many organ systems in your body.  It can also affect the growth of your baby. The exact cause of preeclampsia is unknown, however, there are signs and symptoms to watch for:    -A bad headache that doesn't improve with Tylenol  -Visual changes such as spots, flashes of light, blurry vision  -Pain in the upper right part of your abdomen, especially under the ribs that doesn't go away  -Nausea and/or vomiting  -Feeling extremely tired  -Yellowing of the skin and/or eyes  -Feeling \"not quite right\" or that something is wrong  -An extreme amount of swelling (some swelling in pregnancy is very normal)    If your midwife feels that you are developing preeclampsia, you will have lab tests drawn and will be monitored very closely.     If you are experiencing anyof these symptoms, call the [facility] immediately at [phone].              NATURAL LABOR PREPARATION    So, you're getting closer and closer to your due date and wondering what you can do to help get your body ready for labor.   Here are some natural methods you can try:    NOTE: DO NOT begin any of the following prior to 36 " completed weeks of pregnancy!    Evening Primrose Oil: Take 1000mg by mouth three times per day. This encourages the cervix to ripen. Cervical ripening is when your cervix becomes more soft and stretchy to get ready for dilation and effacement.     Red Raspberry Leaf Tea: Red raspberry tea (get it at a Co[employer]op or in the grocery store). Drink three cups per day (hot or cold). This can help to prepare the uterine muscles for labor.              Fetal Kick Counts    It is important to know when your baby's movements occur. We often get busy with work and life and do not pay close attention to their movements.      Women typically begin feeling movement between 18-22 weeks of gestation, sometimes it can be earlier or later depending on where your placenta is     Movements usually begin feeling like popping or fluttering and as the baby grows they become more pronounced    Toward the end of pregnancy as the baby gets larger they may not move as much or make as big of movements. Babies have maturing sleep cycles as well as not as much room to move and flip. If you are ever concerned about your baby's movements or have not felt the baby move for a while, we recommend you do a fetal kick count. Prior to starting your count drink a glass of water or juice and eat a snack. Then lay down on your side and begin to count movements.     How to do a Fetal Kick Counts    There are many different ways to monitor your baby's movements. Movements can range from large jabs to small kicks, or wiggles.  Hiccups count!    Count 10 movements in 2 hours when resting and focusing  Count 10 movements in 12 hours when doing normal activity    We recommend that if movements occur but seem decreased that you should be seen in the clinic or hospital for evaluation within 12 hours. If fetal movement is absent or fetal kick counts are low please contact us right away.    If you ever have any concerns about your baby's movements DO NOT HESITATE to  call us, we are here for you!    [facility]  [phone]        COMMON MEDICATIONS AFTER GIVING BIRTH    After giving birth, it is common to be prescribed medications that are available over the counter.  It is recommended that you purchase these medications before you have your baby and have at home.  The common medications are:  Colace 100mg (generic name docusate sodium)  Motrin or Advil 200mg (generic name ibuprofen)  Tylenol 325-500mg (generic name acetaminophen)    If you are unable to find the medications or would prefer to have prescriptions sent to a pharmacy, that can be done when you are being discharged from the hospital.  If you have any questions or concerns, please do not hesitate to contact our clinic: [phone].

## 2022-04-18 NOTE — LETTER
To Whom It May Concern:    David Clement is a client in our clinic. She has an BONNIE of April 23, 2022. I am advising her to stop working at that time. If you have any questions or concerns, please contact us.    Thank you,        Keagan Rodrigues, DNP, APRN, CNM

## 2022-04-18 NOTE — PROGRESS NOTES
Feels well overall. Here alone today.   Fetal movement: positive   Taking oral iron supplements.   Denies vaginal bleeding/lof, irregular contractions  Not given to stop work at 40 weeks (FMLA)  Warning signs reviewed   Labor signs and symptoms discussed  Denies s/sx of preeclampsia and aware of what to report  Return to clinic 1 week    Keagan Rodrigues, DNP, APRN, CNM

## 2022-04-24 NOTE — PATIENT INSTRUCTIONS
"Labor Instructions for Midwife Patients    When to call:  Both during and after office hours call  [phone]. There is a triage RN to take your calls and answer your questions 24 hours a day.  If they cannot answer your question they will page the midwife on call for you.    When to call:  Call anytime you have important concerns about you or your baby.     Call if:  You are having contractions at regular intervals about 5-6 minutes apart lasting 60 seconds and becoming increasingly more intense   You have an uncontrollable gush of fluid from your vagina or feel a pop and gush like your water has broken  You have HEAVY bleeding, like heavy period, blood running down your legs, or  soaking a pad.   Some bleeding after a pelvic exam, after intercourse, or in labor when your cervix is dilating is normal and is referred to as \"bloody show\"  You have severe, continuous back or abdominal pain  You feel it is time to go to the hospital  If this is your first labor, call when contractions are very intense and have been about every 3-4 minutes for about an hour  If it is your second labor or more, call when contractions are strong and about every 3-5 minutes or sooner depending on your level of discomfort.     Keep in mind we are always here for you! If you have questions, concerns please don't hesitate to call us.     What to eat/drink in labor: Drink plenty of fluid (water most importantly, juice, soda or tea without caffeine). Eat rice, pasta, soup, cereal, bread/toast, and fruit. Avoid dairy and greasy food as they are difficult to digest and you may experience some nausea during labor.    Comfort measures:  Baths and showers (ok even with ruptured membranes, it may temporarily slow contractions if you are still in the early stage of labor)  Warm/hot packs for back pain or discomfort  Back, belly, or thigh massages  Standing, rocking, walking, leaning over bed or tables, side-lying and sleeping    Miscellaneous: " "  Contractions are timed from the beginning of one to the beginning of the next  Try hard to sleep during the early stage of labor when you are not that uncomfortable. Timing of contractions at this point is not important  Even if you cannot sleep, resting in bed or on the couch can help you maintain your energy for labor  When you arrive at the hospital the nurse will check your baby's heartbeat, check your cervix, and will call us. The midwife on call will come in and be with you when you are in active labor  After hours you need to enter the hospital through the emergency room    PREECLAMPSIA SIGNS AND SYMPTOMS    Preeclampsia is a dangerous condition that some women develop in the second half of pregnancy. It can also begin after the baby is born.  Preeclampsia causes high blood pressure and can cause problems with many organ systems in your body.  It can also affect the growth of your baby. The exact cause of preeclampsia is unknown, however, there are signs and symptoms to watch for:    -A bad headache that doesn't improve with Tylenol  -Visual changes such as spots, flashes of light, blurry vision  -Pain in the upper right part of your abdomen, especially under the ribs that doesn't go away  -Nausea and/or vomiting  -Feeling extremely tired  -Yellowing of the skin and/or eyes  -Feeling \"not quite right\" or that something is wrong  -An extreme amount of swelling (some swelling in pregnancy is very normal)    If your midwife feels that you are developing preeclampsia, you will have lab tests drawn and will be monitored very closely.     If you are experiencing anyof these symptoms, call the [facility] immediately at [phone].    Fetal Kick Counts    It is important to know when your baby's movements occur. We often get busy with work and life and do not pay close attention to their movements.      Women typically begin feeling movement between 18-22 weeks of gestation, sometimes it " can be earlier or later depending on where your placenta is     Movements usually begin feeling like popping or fluttering and as the baby grows they become more pronounced    Toward the end of pregnancy as the baby gets larger they may not move as much or make as big of movements. Babies have maturing sleep cycles as well as not as much room to move and flip. If you are ever concerned about your baby's movements or have not felt the baby move for a while, we recommend you do a fetal kick count. Prior to starting your count drink a glass of water or juice and eat a snack. Then lay down on your side and begin to count movements.     How to do a Fetal Kick Counts    There are many different ways to monitor your baby's movements. Movements can range from large jabs to small kicks, or wiggles.  Hiccups count!    Count 10 movements in 2 hours when resting and focusing  Count 10 movements in 12 hours when doing normal activity    We recommend that if movements occur but seem decreased that you should be seen in the clinic or hospital for evaluation within 12 hours. If fetal movement is absent or fetal kick counts are low please contact us right away.    If you ever have any concerns about your baby's movements DO NOT HESITATE to call us, we are here for you!    [facility]  [phone]

## 2022-04-24 NOTE — PROGRESS NOTES
Feeling really ready to be done, not sleeping great. Having more max thompson and cramping, especially last night. Really hoping to avoid induction but also not sure she wants to go past 41 weeks.   Fetal movement: positive  Denies vaginal bleeding/lof, some contractions  Cervix: FT/50%/-3/soft/posterior  Warning signs reviewed   Labor signs and symptoms discussed  Denies s/sx of pre-eclampsia and aware of what to report  Discussed post dates management, order for BPP between 40-41 weeks, again at 41 and 41+ weeks, discussed recommendation for induction of labor after 41 weeks  Plan to return Friday for BPP and CNM visit, will discuss IOL further at that time, she may want to proceed with induction on Saturday 4/30 (will be 41 weeks exactly) but is unsure at this time. Also discussed possibly stripping membranes on Friday, unable to do today.   Rx for Vistaril to use prn for sleep sent   Return to clinic Friday 4/29 for BPP and CNM visit    PEE Gamez, CNM

## 2022-04-25 ENCOUNTER — PRENATAL OFFICE VISIT (OUTPATIENT)
Dept: MIDWIFE SERVICES | Facility: CLINIC | Age: 27
End: 2022-04-25
Payer: COMMERCIAL

## 2022-04-25 VITALS — BODY MASS INDEX: 29.7 KG/M2 | SYSTOLIC BLOOD PRESSURE: 114 MMHG | DIASTOLIC BLOOD PRESSURE: 70 MMHG | WEIGHT: 207 LBS

## 2022-04-25 DIAGNOSIS — O48.0 POST-TERM PREGNANCY, 40-42 WEEKS OF GESTATION: ICD-10-CM

## 2022-04-25 DIAGNOSIS — F41.1 GAD (GENERALIZED ANXIETY DISORDER): ICD-10-CM

## 2022-04-25 DIAGNOSIS — O09.93 SUPERVISION OF HIGH RISK PREGNANCY IN THIRD TRIMESTER: ICD-10-CM

## 2022-04-25 DIAGNOSIS — O99.013 ANEMIA DURING PREGNANCY IN THIRD TRIMESTER: Primary | ICD-10-CM

## 2022-04-25 DIAGNOSIS — Z3A.40 40 WEEKS GESTATION OF PREGNANCY: ICD-10-CM

## 2022-04-25 PROCEDURE — 99207 PR PRENATAL VISIT: CPT | Performed by: ADVANCED PRACTICE MIDWIFE

## 2022-04-25 RX ORDER — HYDROXYZINE PAMOATE 50 MG/1
50 CAPSULE ORAL
Qty: 15 CAPSULE | Refills: 0 | Status: ON HOLD | OUTPATIENT
Start: 2022-04-25 | End: 2022-05-01

## 2022-04-29 ENCOUNTER — HOSPITAL ENCOUNTER (INPATIENT)
Facility: CLINIC | Age: 27
LOS: 2 days | Discharge: HOME HEALTH | End: 2022-05-01
Attending: ADVANCED PRACTICE MIDWIFE | Admitting: ADVANCED PRACTICE MIDWIFE
Payer: COMMERCIAL

## 2022-04-29 ENCOUNTER — ANESTHESIA (OUTPATIENT)
Dept: OBGYN | Facility: CLINIC | Age: 27
End: 2022-04-29
Payer: COMMERCIAL

## 2022-04-29 ENCOUNTER — ANESTHESIA EVENT (OUTPATIENT)
Dept: OBGYN | Facility: CLINIC | Age: 27
End: 2022-04-29
Payer: COMMERCIAL

## 2022-04-29 ENCOUNTER — NURSE TRIAGE (OUTPATIENT)
Dept: NURSING | Facility: CLINIC | Age: 27
End: 2022-04-29
Payer: COMMERCIAL

## 2022-04-29 DIAGNOSIS — F41.9 ANXIETY: ICD-10-CM

## 2022-04-29 DIAGNOSIS — D62 ANEMIA DUE TO BLOOD LOSS, ACUTE: ICD-10-CM

## 2022-04-29 PROBLEM — Z36.89 ENCOUNTER FOR TRIAGE IN PREGNANT PATIENT: Status: ACTIVE | Noted: 2022-04-29

## 2022-04-29 LAB
ABO/RH(D): NORMAL
ALBUMIN SERPL-MCNC: 2.8 G/DL (ref 3.4–5)
ALP SERPL-CCNC: 168 U/L (ref 40–150)
ALT SERPL W P-5'-P-CCNC: 25 U/L (ref 0–50)
ANION GAP SERPL CALCULATED.3IONS-SCNC: 7 MMOL/L (ref 3–14)
ANTIBODY SCREEN: NEGATIVE
AST SERPL W P-5'-P-CCNC: 15 U/L (ref 0–45)
BILIRUB SERPL-MCNC: 0.2 MG/DL (ref 0.2–1.3)
BUN SERPL-MCNC: 8 MG/DL (ref 7–30)
CALCIUM SERPL-MCNC: 8.6 MG/DL (ref 8.5–10.1)
CHLORIDE BLD-SCNC: 109 MMOL/L (ref 94–109)
CO2 SERPL-SCNC: 20 MMOL/L (ref 20–32)
CREAT SERPL-MCNC: 0.59 MG/DL (ref 0.52–1.04)
CREAT UR-MCNC: 122 MG/DL
ERYTHROCYTE [DISTWIDTH] IN BLOOD BY AUTOMATED COUNT: 15 % (ref 10–15)
GFR SERPL CREATININE-BSD FRML MDRD: >90 ML/MIN/1.73M2
GLUCOSE BLD-MCNC: 93 MG/DL (ref 70–99)
HCT VFR BLD AUTO: 35.9 % (ref 35–47)
HGB BLD-MCNC: 11.6 G/DL (ref 11.7–15.7)
MCH RBC QN AUTO: 28.6 PG (ref 26.5–33)
MCHC RBC AUTO-ENTMCNC: 32.3 G/DL (ref 31.5–36.5)
MCV RBC AUTO: 88 FL (ref 78–100)
PLATELET # BLD AUTO: 227 10E3/UL (ref 150–450)
POTASSIUM BLD-SCNC: 3.7 MMOL/L (ref 3.4–5.3)
PROT SERPL-MCNC: 6.5 G/DL (ref 6.8–8.8)
PROT UR-MCNC: 0.26 G/L
PROT/CREAT 24H UR: 0.21 G/G CR (ref 0–0.2)
RBC # BLD AUTO: 4.06 10E6/UL (ref 3.8–5.2)
SARS-COV-2 RNA RESP QL NAA+PROBE: NEGATIVE
SODIUM SERPL-SCNC: 136 MMOL/L (ref 133–144)
SPECIMEN EXPIRATION DATE: NORMAL
T PALLIDUM AB SER QL: NONREACTIVE
URATE SERPL-MCNC: 3.6 MG/DL (ref 2.6–6)
WBC # BLD AUTO: 13 10E3/UL (ref 4–11)

## 2022-04-29 PROCEDURE — 722N000001 HC LABOR CARE VAGINAL DELIVERY SINGLE

## 2022-04-29 PROCEDURE — U0005 INFEC AGEN DETEC AMPLI PROBE: HCPCS | Performed by: ADVANCED PRACTICE MIDWIFE

## 2022-04-29 PROCEDURE — 250N000009 HC RX 250: Performed by: ADVANCED PRACTICE MIDWIFE

## 2022-04-29 PROCEDURE — 59025 FETAL NON-STRESS TEST: CPT

## 2022-04-29 PROCEDURE — 250N000011 HC RX IP 250 OP 636: Performed by: ANESTHESIOLOGY

## 2022-04-29 PROCEDURE — 86901 BLOOD TYPING SEROLOGIC RH(D): CPT | Performed by: ADVANCED PRACTICE MIDWIFE

## 2022-04-29 PROCEDURE — 59025 FETAL NON-STRESS TEST: CPT | Mod: 59

## 2022-04-29 PROCEDURE — 80053 COMPREHEN METABOLIC PANEL: CPT | Performed by: ADVANCED PRACTICE MIDWIFE

## 2022-04-29 PROCEDURE — 370N000003 HC ANESTHESIA WARD SERVICE

## 2022-04-29 PROCEDURE — 0KQM0ZZ REPAIR PERINEUM MUSCLE, OPEN APPROACH: ICD-10-PCS | Performed by: ADVANCED PRACTICE MIDWIFE

## 2022-04-29 PROCEDURE — 250N000011 HC RX IP 250 OP 636: Performed by: ADVANCED PRACTICE MIDWIFE

## 2022-04-29 PROCEDURE — 10907ZC DRAINAGE OF AMNIOTIC FLUID, THERAPEUTIC FROM PRODUCTS OF CONCEPTION, VIA NATURAL OR ARTIFICIAL OPENING: ICD-10-PCS | Performed by: ADVANCED PRACTICE MIDWIFE

## 2022-04-29 PROCEDURE — 250N000013 HC RX MED GY IP 250 OP 250 PS 637: Performed by: ADVANCED PRACTICE MIDWIFE

## 2022-04-29 PROCEDURE — 250N000009 HC RX 250: Performed by: ANESTHESIOLOGY

## 2022-04-29 PROCEDURE — 36415 COLL VENOUS BLD VENIPUNCTURE: CPT | Performed by: ADVANCED PRACTICE MIDWIFE

## 2022-04-29 PROCEDURE — 999N000016 HC STATISTIC ATTENDANCE AT DELIVERY

## 2022-04-29 PROCEDURE — 258N000003 HC RX IP 258 OP 636: Performed by: ANESTHESIOLOGY

## 2022-04-29 PROCEDURE — 59400 OBSTETRICAL CARE: CPT | Performed by: ADVANCED PRACTICE MIDWIFE

## 2022-04-29 PROCEDURE — 86780 TREPONEMA PALLIDUM: CPT | Performed by: ADVANCED PRACTICE MIDWIFE

## 2022-04-29 PROCEDURE — 84156 ASSAY OF PROTEIN URINE: CPT | Performed by: ADVANCED PRACTICE MIDWIFE

## 2022-04-29 PROCEDURE — 120N000012 HC R&B POSTPARTUM

## 2022-04-29 PROCEDURE — 84550 ASSAY OF BLOOD/URIC ACID: CPT | Performed by: ADVANCED PRACTICE MIDWIFE

## 2022-04-29 PROCEDURE — 0UQG7ZZ REPAIR VAGINA, VIA NATURAL OR ARTIFICIAL OPENING: ICD-10-PCS | Performed by: ADVANCED PRACTICE MIDWIFE

## 2022-04-29 PROCEDURE — 85027 COMPLETE CBC AUTOMATED: CPT | Performed by: ADVANCED PRACTICE MIDWIFE

## 2022-04-29 PROCEDURE — 3E0R3BZ INTRODUCTION OF ANESTHETIC AGENT INTO SPINAL CANAL, PERCUTANEOUS APPROACH: ICD-10-PCS | Performed by: ANESTHESIOLOGY

## 2022-04-29 PROCEDURE — 00HU33Z INSERTION OF INFUSION DEVICE INTO SPINAL CANAL, PERCUTANEOUS APPROACH: ICD-10-PCS | Performed by: ANESTHESIOLOGY

## 2022-04-29 PROCEDURE — G0463 HOSPITAL OUTPT CLINIC VISIT: HCPCS | Mod: 25

## 2022-04-29 RX ORDER — BISACODYL 10 MG
10 SUPPOSITORY, RECTAL RECTAL DAILY PRN
Status: DISCONTINUED | OUTPATIENT
Start: 2022-04-29 | End: 2022-05-01 | Stop reason: HOSPADM

## 2022-04-29 RX ORDER — OXYTOCIN 10 [USP'U]/ML
10 INJECTION, SOLUTION INTRAMUSCULAR; INTRAVENOUS
Status: DISCONTINUED | OUTPATIENT
Start: 2022-04-29 | End: 2022-05-01 | Stop reason: HOSPADM

## 2022-04-29 RX ORDER — MISOPROSTOL 200 UG/1
800 TABLET ORAL
Status: DISCONTINUED | OUTPATIENT
Start: 2022-04-29 | End: 2022-04-29 | Stop reason: HOSPADM

## 2022-04-29 RX ORDER — METOCLOPRAMIDE HYDROCHLORIDE 5 MG/ML
10 INJECTION INTRAMUSCULAR; INTRAVENOUS EVERY 6 HOURS PRN
Status: DISCONTINUED | OUTPATIENT
Start: 2022-04-29 | End: 2022-04-29 | Stop reason: HOSPADM

## 2022-04-29 RX ORDER — DOCUSATE SODIUM 100 MG/1
100 CAPSULE, LIQUID FILLED ORAL DAILY
Status: DISCONTINUED | OUTPATIENT
Start: 2022-04-29 | End: 2022-05-01 | Stop reason: HOSPADM

## 2022-04-29 RX ORDER — NALOXONE HYDROCHLORIDE 0.4 MG/ML
0.4 INJECTION, SOLUTION INTRAMUSCULAR; INTRAVENOUS; SUBCUTANEOUS
Status: DISCONTINUED | OUTPATIENT
Start: 2022-04-29 | End: 2022-04-29 | Stop reason: HOSPADM

## 2022-04-29 RX ORDER — ONDANSETRON 4 MG/1
4 TABLET, ORALLY DISINTEGRATING ORAL EVERY 6 HOURS PRN
Status: DISCONTINUED | OUTPATIENT
Start: 2022-04-29 | End: 2022-04-29 | Stop reason: HOSPADM

## 2022-04-29 RX ORDER — KETOROLAC TROMETHAMINE 30 MG/ML
30 INJECTION, SOLUTION INTRAMUSCULAR; INTRAVENOUS
Status: DISCONTINUED | OUTPATIENT
Start: 2022-04-29 | End: 2022-05-01 | Stop reason: HOSPADM

## 2022-04-29 RX ORDER — IBUPROFEN 400 MG/1
800 TABLET, FILM COATED ORAL
Status: DISCONTINUED | OUTPATIENT
Start: 2022-04-29 | End: 2022-05-01 | Stop reason: HOSPADM

## 2022-04-29 RX ORDER — EPHEDRINE SULFATE 50 MG/ML
5 INJECTION, SOLUTION INTRAMUSCULAR; INTRAVENOUS; SUBCUTANEOUS
Status: DISCONTINUED | OUTPATIENT
Start: 2022-04-29 | End: 2022-04-29 | Stop reason: HOSPADM

## 2022-04-29 RX ORDER — OXYTOCIN/0.9 % SODIUM CHLORIDE 30/500 ML
340 PLASTIC BAG, INJECTION (ML) INTRAVENOUS CONTINUOUS PRN
Status: DISCONTINUED | OUTPATIENT
Start: 2022-04-29 | End: 2022-04-29 | Stop reason: HOSPADM

## 2022-04-29 RX ORDER — PROCHLORPERAZINE MALEATE 5 MG
10 TABLET ORAL EVERY 6 HOURS PRN
Status: DISCONTINUED | OUTPATIENT
Start: 2022-04-29 | End: 2022-04-29 | Stop reason: HOSPADM

## 2022-04-29 RX ORDER — HYDROCORTISONE 2.5 %
CREAM (GRAM) TOPICAL 3 TIMES DAILY PRN
Status: DISCONTINUED | OUTPATIENT
Start: 2022-04-29 | End: 2022-05-01 | Stop reason: HOSPADM

## 2022-04-29 RX ORDER — CITRIC ACID/SODIUM CITRATE 334-500MG
30 SOLUTION, ORAL ORAL
Status: DISCONTINUED | OUTPATIENT
Start: 2022-04-29 | End: 2022-04-29 | Stop reason: HOSPADM

## 2022-04-29 RX ORDER — OXYTOCIN/0.9 % SODIUM CHLORIDE 30/500 ML
100-340 PLASTIC BAG, INJECTION (ML) INTRAVENOUS CONTINUOUS PRN
Status: DISCONTINUED | OUTPATIENT
Start: 2022-04-29 | End: 2022-05-01 | Stop reason: HOSPADM

## 2022-04-29 RX ORDER — METOCLOPRAMIDE 10 MG/1
10 TABLET ORAL EVERY 6 HOURS PRN
Status: DISCONTINUED | OUTPATIENT
Start: 2022-04-29 | End: 2022-04-29 | Stop reason: HOSPADM

## 2022-04-29 RX ORDER — TRANEXAMIC ACID 10 MG/ML
1 INJECTION, SOLUTION INTRAVENOUS EVERY 30 MIN PRN
Status: DISCONTINUED | OUTPATIENT
Start: 2022-04-29 | End: 2022-04-29 | Stop reason: HOSPADM

## 2022-04-29 RX ORDER — PROCHLORPERAZINE 25 MG
25 SUPPOSITORY, RECTAL RECTAL EVERY 12 HOURS PRN
Status: DISCONTINUED | OUTPATIENT
Start: 2022-04-29 | End: 2022-04-29 | Stop reason: HOSPADM

## 2022-04-29 RX ORDER — SODIUM CHLORIDE, SODIUM LACTATE, POTASSIUM CHLORIDE, CALCIUM CHLORIDE 600; 310; 30; 20 MG/100ML; MG/100ML; MG/100ML; MG/100ML
INJECTION, SOLUTION INTRAVENOUS CONTINUOUS PRN
Status: DISCONTINUED | OUTPATIENT
Start: 2022-04-29 | End: 2022-04-29 | Stop reason: HOSPADM

## 2022-04-29 RX ORDER — CARBOPROST TROMETHAMINE 250 UG/ML
250 INJECTION, SOLUTION INTRAMUSCULAR
Status: DISCONTINUED | OUTPATIENT
Start: 2022-04-29 | End: 2022-04-29 | Stop reason: HOSPADM

## 2022-04-29 RX ORDER — OXYTOCIN/0.9 % SODIUM CHLORIDE 30/500 ML
1-24 PLASTIC BAG, INJECTION (ML) INTRAVENOUS CONTINUOUS
Status: DISCONTINUED | OUTPATIENT
Start: 2022-04-29 | End: 2022-04-29 | Stop reason: HOSPADM

## 2022-04-29 RX ORDER — ONDANSETRON 2 MG/ML
4 INJECTION INTRAMUSCULAR; INTRAVENOUS EVERY 6 HOURS PRN
Status: DISCONTINUED | OUTPATIENT
Start: 2022-04-29 | End: 2022-04-29 | Stop reason: HOSPADM

## 2022-04-29 RX ORDER — NALOXONE HYDROCHLORIDE 0.4 MG/ML
0.2 INJECTION, SOLUTION INTRAMUSCULAR; INTRAVENOUS; SUBCUTANEOUS
Status: DISCONTINUED | OUTPATIENT
Start: 2022-04-29 | End: 2022-04-29 | Stop reason: HOSPADM

## 2022-04-29 RX ORDER — MISOPROSTOL 200 UG/1
400 TABLET ORAL
Status: DISCONTINUED | OUTPATIENT
Start: 2022-04-29 | End: 2022-05-01 | Stop reason: HOSPADM

## 2022-04-29 RX ORDER — OXYTOCIN/0.9 % SODIUM CHLORIDE 30/500 ML
340 PLASTIC BAG, INJECTION (ML) INTRAVENOUS CONTINUOUS PRN
Status: DISCONTINUED | OUTPATIENT
Start: 2022-04-29 | End: 2022-05-01 | Stop reason: HOSPADM

## 2022-04-29 RX ORDER — ACETAMINOPHEN 325 MG/1
650 TABLET ORAL EVERY 4 HOURS PRN
Status: DISCONTINUED | OUTPATIENT
Start: 2022-04-29 | End: 2022-05-01 | Stop reason: HOSPADM

## 2022-04-29 RX ORDER — MISOPROSTOL 200 UG/1
800 TABLET ORAL
Status: DISCONTINUED | OUTPATIENT
Start: 2022-04-29 | End: 2022-05-01 | Stop reason: HOSPADM

## 2022-04-29 RX ORDER — OXYTOCIN 10 [USP'U]/ML
10 INJECTION, SOLUTION INTRAMUSCULAR; INTRAVENOUS
Status: DISCONTINUED | OUTPATIENT
Start: 2022-04-29 | End: 2022-04-29 | Stop reason: HOSPADM

## 2022-04-29 RX ORDER — TRANEXAMIC ACID 10 MG/ML
1 INJECTION, SOLUTION INTRAVENOUS EVERY 30 MIN PRN
Status: DISCONTINUED | OUTPATIENT
Start: 2022-04-29 | End: 2022-05-01 | Stop reason: HOSPADM

## 2022-04-29 RX ORDER — METHYLERGONOVINE MALEATE 0.2 MG/ML
200 INJECTION INTRAVENOUS
Status: DISCONTINUED | OUTPATIENT
Start: 2022-04-29 | End: 2022-05-01 | Stop reason: HOSPADM

## 2022-04-29 RX ORDER — FENTANYL CITRATE 50 UG/ML
100 INJECTION, SOLUTION INTRAMUSCULAR; INTRAVENOUS
Status: DISCONTINUED | OUTPATIENT
Start: 2022-04-29 | End: 2022-04-29 | Stop reason: HOSPADM

## 2022-04-29 RX ORDER — ROPIVACAINE HYDROCHLORIDE 2 MG/ML
10 INJECTION, SOLUTION EPIDURAL; INFILTRATION; PERINEURAL ONCE
Status: COMPLETED | OUTPATIENT
Start: 2022-04-29 | End: 2022-04-29

## 2022-04-29 RX ORDER — METHYLERGONOVINE MALEATE 0.2 MG/ML
200 INJECTION INTRAVENOUS
Status: DISCONTINUED | OUTPATIENT
Start: 2022-04-29 | End: 2022-04-29 | Stop reason: HOSPADM

## 2022-04-29 RX ORDER — CARBOPROST TROMETHAMINE 250 UG/ML
250 INJECTION, SOLUTION INTRAMUSCULAR
Status: DISCONTINUED | OUTPATIENT
Start: 2022-04-29 | End: 2022-05-01 | Stop reason: HOSPADM

## 2022-04-29 RX ORDER — MISOPROSTOL 200 UG/1
400 TABLET ORAL
Status: DISCONTINUED | OUTPATIENT
Start: 2022-04-29 | End: 2022-04-29 | Stop reason: HOSPADM

## 2022-04-29 RX ORDER — NALBUPHINE HYDROCHLORIDE 20 MG/ML
2.5-5 INJECTION, SOLUTION INTRAMUSCULAR; INTRAVENOUS; SUBCUTANEOUS EVERY 6 HOURS PRN
Status: DISCONTINUED | OUTPATIENT
Start: 2022-04-29 | End: 2022-05-01 | Stop reason: HOSPADM

## 2022-04-29 RX ORDER — MODIFIED LANOLIN
OINTMENT (GRAM) TOPICAL
Status: DISCONTINUED | OUTPATIENT
Start: 2022-04-29 | End: 2022-05-01 | Stop reason: HOSPADM

## 2022-04-29 RX ORDER — LIDOCAINE HYDROCHLORIDE AND EPINEPHRINE 15; 5 MG/ML; UG/ML
INJECTION, SOLUTION EPIDURAL PRN
Status: DISCONTINUED | OUTPATIENT
Start: 2022-04-29 | End: 2022-04-29

## 2022-04-29 RX ORDER — IBUPROFEN 400 MG/1
800 TABLET, FILM COATED ORAL EVERY 6 HOURS PRN
Status: DISCONTINUED | OUTPATIENT
Start: 2022-04-29 | End: 2022-05-01 | Stop reason: HOSPADM

## 2022-04-29 RX ADMIN — BUPIVACAINE HYDROCHLORIDE 12 ML/HR: 7.5 INJECTION, SOLUTION EPIDURAL; RETROBULBAR at 10:34

## 2022-04-29 RX ADMIN — Medication 2 MILLI-UNITS/MIN: at 06:43

## 2022-04-29 RX ADMIN — BUPIVACAINE HYDROCHLORIDE: 7.5 INJECTION, SOLUTION EPIDURAL; RETROBULBAR at 04:40

## 2022-04-29 RX ADMIN — LIDOCAINE HYDROCHLORIDE 20 ML: 10 INJECTION, SOLUTION INFILTRATION; PERINEURAL at 13:11

## 2022-04-29 RX ADMIN — ACETAMINOPHEN 650 MG: 325 TABLET ORAL at 14:32

## 2022-04-29 RX ADMIN — LIDOCAINE HYDROCHLORIDE AND EPINEPHRINE 3 ML: 15; 5 INJECTION, SOLUTION EPIDURAL at 04:51

## 2022-04-29 RX ADMIN — FENTANYL CITRATE 100 MCG: 50 INJECTION, SOLUTION INTRAMUSCULAR; INTRAVENOUS at 04:11

## 2022-04-29 RX ADMIN — IBUPROFEN 800 MG: 400 TABLET ORAL at 14:32

## 2022-04-29 RX ADMIN — ACETAMINOPHEN 650 MG: 325 TABLET ORAL at 20:23

## 2022-04-29 RX ADMIN — ROPIVACAINE HYDROCHLORIDE 5 ML: 2 INJECTION, SOLUTION EPIDURAL; INFILTRATION at 04:53

## 2022-04-29 RX ADMIN — ROPIVACAINE HYDROCHLORIDE 5 ML: 2 INJECTION, SOLUTION EPIDURAL; INFILTRATION at 04:55

## 2022-04-29 RX ADMIN — IBUPROFEN 800 MG: 400 TABLET ORAL at 20:23

## 2022-04-29 ASSESSMENT — ACTIVITIES OF DAILY LIVING (ADL)
WEAR_GLASSES_OR_BLIND: NO
ADLS_ACUITY_SCORE: 3
CHANGE_IN_FUNCTIONAL_STATUS_SINCE_ONSET_OF_CURRENT_ILLNESS/INJURY: NO
ADLS_ACUITY_SCORE: 3
TOILETING_ISSUES: NO
WALKING_OR_CLIMBING_STAIRS_DIFFICULTY: NO
ADLS_ACUITY_SCORE: 3
ADLS_ACUITY_SCORE: 3
DIFFICULTY_EATING/SWALLOWING: NO
FALL_HISTORY_WITHIN_LAST_SIX_MONTHS: NO
DRESSING/BATHING_DIFFICULTY: NO
DOING_ERRANDS_INDEPENDENTLY_DIFFICULTY: NO
CONCENTRATING,_REMEMBERING_OR_MAKING_DECISIONS_DIFFICULTY: NO
ADLS_ACUITY_SCORE: 3

## 2022-04-29 ASSESSMENT — LIFESTYLE VARIABLES: TOBACCO_USE: 0

## 2022-04-29 NOTE — PROGRESS NOTES
"CNM PROGRESS NOTE    SUBJECTIVE:  David is feeling better than this morning, she is mostly comfortable with epidural but is having some breakthrough pain that is relieved with PCEA use. She is ready for labor to progress and baby to be here. Mother Apryl is at the bedside for support.     OBJECTIVE:  /61   Temp 98.3  F (36.8  C) (Oral)   Resp 20   Ht 1.778 m (5' 10\")   LMP 2021   SpO2 96%   BMI 29.70 kg/m      Fetal heart tones: Baseline 135   Variability: moderate   Accelerations: present  Decelerations: absent    Contractions: Pt is senhtil every 2-3 minutes, lasting  seconds and palpates moderate    Cervix: 6/ 90% / -2, BBOW  ROM: AROM clear fluid @1101    Pitocin- 8 mu/min.  Antibiotics- none    ASSESSMENT:  IUP @ 40w6d active labor   GBS- negative  COVID negative   Normotensive   Afebrile    PLAN:   Discussed options for labor augmentation with AROM vs expectant management. David is agreeable to AROM. AROM performed for moderate amounts of clear fluid.   Continue with pitocin augmentation per unit protocol. If contractions become too frequent will decrease pitocin rate   Pain medication - comfortable with epidural, continue to use PCEA button as needed  Anticipate   Labor augmentation with Pitocin, AROM  reevaluate in 2-4 hours/PRN    PEE Gamez, VINNY      "

## 2022-04-29 NOTE — PLAN OF CARE
"  Problem: Plan of Care - These are the overarching goals to be used throughout the patient stay.    Goal: Plan of Care Review/Shift Note  Description: The Plan of Care Review/Shift note should be completed every shift.  The Outcome Evaluation is a brief statement about your assessment that the patient is improving, declining, or no change.  This information will be displayed automatically on your shift note.  Outcome: Ongoing, Progressing  Goal: Patient-Specific Goal (Individualized)  Description: You can add care plan individualizations to a care plan. Examples of Individualization might be:  \"Parent requests to be called daily at 9am for status\", \"I have a hard time hearing out of my right ear\", or \"Do not touch me to wake me up as it startles me\".  Outcome: Ongoing, Progressing  Goal: Absence of Hospital-Acquired Illness or Injury  Outcome: Ongoing, Progressing  Goal: Optimal Comfort and Wellbeing  Outcome: Ongoing, Progressing  Intervention: Provide Person-Centered Care  Recent Flowsheet Documentation  Taken 4/29/2022 0430 by Radha Nicholson RN  Trust Relationship/Rapport:   care explained   choices provided   emotional support provided   empathic listening provided   questions answered   questions encouraged   reassurance provided   thoughts/feelings acknowledged  Goal: Readiness for Transition of Care  Outcome: Ongoing, Progressing     Problem: Bleeding (Labor)  Goal: Hemostasis  Outcome: Ongoing, Progressing     Problem: Change in Fetal Wellbeing (Labor)  Goal: Stable Fetal Wellbeing  Outcome: Ongoing, Progressing  Intervention: Promote and Monitor Fetal Wellbeing  Recent Flowsheet Documentation  Taken 4/29/2022 0430 by Radha Nicholson RN  Fetal Wellbeing Promotion: fetal heart rate monitored     Problem: Delayed Labor Progression (Labor)  Goal: Effective Progression to Delivery  Outcome: Ongoing, Progressing     Problem: Infection (Labor)  Goal: Absence of Infection Signs and Symptoms  Outcome: Ongoing, " Progressing     Problem: Labor Pain (Labor)  Goal: Acceptable Pain Control  Outcome: Ongoing, Progressing     Problem: Uterine Tachysystole (Labor)  Goal: Normal Uterine Contraction Pattern  Outcome: Ongoing, Progressing

## 2022-04-29 NOTE — PROGRESS NOTES
David is comfortable with labor epidural and  and mother at bedside.  Discussed plan for day and encouraged rest and relaxation.  Keagan Rodrigues at bedside, Kiera Lentz CNM managing care today.

## 2022-04-29 NOTE — ANESTHESIA PREPROCEDURE EVALUATION
Anesthesia Pre-Procedure Evaluation    Patient: David Clement   MRN: [id number] : 1995        Procedure :           Past Medical History:   Diagnosis Date     ADHD      Anxiety       Past Surgical History:   Procedure Laterality Date     GI SURGERY      Hernia Repair -      HEAD & NECK SURGERY      [place] teeth extraction      No Known Allergies   Social History     Tobacco Use     Smoking status: Never Smoker     Smokeless tobacco: Never Used   Substance Use Topics     Alcohol use: Not Currently      Wt Readings from Last 1 Encounters:   22 93.9 kg (207 lb)        Anesthesia Evaluation   Pt has had prior anesthetic. Type: General.    No history of anesthetic complications       ROS/MED HX  ENT/Pulmonary:    (-) tobacco use   Neurologic:       Cardiovascular:       METS/Exercise Tolerance:     Hematologic:       Musculoskeletal:       GI/Hepatic:       Renal/Genitourinary:       Endo:       Psychiatric/Substance Use:     (+) psychiatric history anxiety and other (comment) (ADHD)     Infectious Disease:       Malignancy:       Other:            Physical Exam    Airway        Mallampati: II   TM distance: > 3 FB   Neck ROM: full   Mouth opening: > 3 cm    Respiratory Devices and Support         Dental  no notable dental history         Cardiovascular   cardiovascular exam normal          Pulmonary   pulmonary exam normal                OUTSIDE LABS:  CBC:   Lab Results   Component Value Date    WBC 13.0 (H) 2022    WBC 9.0 2022    HGB 11.6 (L) 2022    HGB 9.9 (L) 2022    HCT 35.9 2022    HCT 32.0 (L) 2022     2022     2022     BMP: No results found for: NA, POTASSIUM, CHLORIDE, CO2, BUN, CR, GLC  COAGS: No results found for: PTT, INR, FIBR  POC: No results found for: BGM, HCG, HCGS  HEPATIC: No results found for: ALBUMIN, PROTTOTAL, ALT, AST, GGT, ALKPHOS, BILITOTAL, BILIDIRECT, SHABNAM  OTHER: No results found for: PH, LACT, A1C, LOU,  PHOS, MAG, LIPASE, AMYLASE, TSH, T4, T3, CRP, SED    Anesthesia Plan    ASA Status:  2      Anesthesia Type: Epidural.              Consents    Anesthesia Plan(s) and associated risks, benefits, and realistic alternatives discussed. Questions answered and patient/representative(s) expressed understanding.    - Discussed:     - Discussed with:  Patient         Postoperative Care            Comments:                Refugio Morales MD

## 2022-04-29 NOTE — H&P
[facility] Labor Admission History & Physical    David Clement is a 27 year old  with an IUP at 40w6d  ; ,   Partner/support Person: [place]  Language Barrier: English  [facility]  Provider: CNM's    David Clement is admitted to the Birthplace at [facility] on 2022 at 3:46 AM       History of present inllness/Chief Complaint:  Started senthil at 2145. Contractions became very unbearable fast and she was unable to talk though them. She arrived to the MAC at 0200. She was in visible pain and unable to sit in bed with contractions.   Here with: spontaneous onset of labor  Patient reports contractions are Regular           Baby active Yes  Membranes are intact.  Bloody show No   Any changes with medical history since last prenatal visit No      Obstetrical history  Estimated Date of Delivery: 2022 determined by LMP  Patient's last menstrual period was 2021.   Dating U/S: 21    Fetal anatomic survey: Normal  Placenta: anterior    PRENATAL COURSE  Prenatal care began at 9w3d gestation for a total of 14 prenatal visits.  Total wt gain 57 lbs; Body mass index is 29.7 kg/m .  Prenatal Blood Pressure: WNL  Prenatal course was essentially uncomplicated  Tdap: 22  Rhogam: n/a    Patient Active Problem List   Diagnosis     Supervision of high risk pregnancy in third trimester     Family history of chromosomal abnormality     Family history of breast cancer     Simple ovarian cyst     YAJAIRA (generalized anxiety disorder)     Anxiety     Anemia during pregnancy in third trimester     Encounter for triage in pregnant patient     Indication for care in labor or delivery       HISTORY  No Known Allergies  Past Medical History:   Diagnosis Date     ADHD      Anxiety      Past Surgical History:   Procedure Laterality Date     GI SURGERY      Hernia Repair -      HEAD & NECK SURGERY      [place] teeth extraction     Family History   Problem Relation  "Age of Onset     Hypertension Mother      Breast Cancer Paternal Grandmother      Social History     Tobacco Use     Smoking status: Never Smoker     Smokeless tobacco: Never Used   Substance Use Topics     Alcohol use: Not Currently     OB History    Para Term  AB Living   1 0 0 0 0 0   SAB IAB Ectopic Multiple Live Births   0 0 0 0 0      # Outcome Date GA Lbr [place]/2nd Weight Sex Delivery Anes PTL Lv   1 Current                LABS:  Lab Results   Component Value Date    AS Negative 2021    HGB 9.9 (L) 2022    HEPBANG Nonreactive 2021       GBS Status:   No results found for: GBS  Rubella: Immune    HIV: Non-Reactive   Platelets:  182  1hr GCT:  101    ROS   Pt is alert and oriented  Pt denies significant constitutional symptoms including fever and/or malaise.    Pt denies significant respiratory, cardiovacular, GI, or muscular/skeletal complaints.    Neuro: Denies HA and visual changes  Muscoloskeletal: Denies except for discomforts r/t pregnancy     PHYSICAL EXAM:  BP (!) 123/90   Temp 97.88  F (36.6  C)   Ht 1.778 m (5' 10\")   LMP 2021   BMI 29.70 kg/m    General appearance:  healthy, alert and active   Lungs: unlabored breathing  Abdomen: gravid, single vertex fetus, non-tender, EFW 8.5 lbs.   Legs:  No edema     Contractions: Pt is senthil every 2-5 minutes, lasting 60 seconds and palpates mild    Fetal heart tones: Baseline 130   Variability: moderate   Accelerations: absent  Decelerations: absent    NST: equivocal    Cervix: 1.5/ 60%/ Posterior/ soft/ -3, Vtx  Bloody show: no  Membranes:  intact    ASSESSMENT:  27 year old  with [place] IUP 40w6d in early labor  NST equivocal; category I  GBS negative and membranes intact      PLAN:  Routine CN care  Labs ordered: CBC, syphilis screening, COVID test, and type and screen  Teaching done r/t comfort measures, pain management options, and labor processes  Plan to start IV then give Fentanyl while prepping for " epidural.   Preeclampsia labs ordered due to 1 increased blood pressure on admission.  Admit - see IP orders  Anticipate     PEE Aden CNM

## 2022-04-29 NOTE — PROVIDER NOTIFICATION
04/29/22 0216   Provider Notification   Provider Name/Title Keagan Rodrigues CNM   Method of Notification Phone   Request Evaluate - Remote   Notification Reason Patient Arrived;MATTE   RN notified CNM of patient here and VINNY JASON coming to bedside to discuss plan of care with pt

## 2022-04-29 NOTE — PLAN OF CARE
Patient up to the bathroom, attempting to void, states she is afraid to pee because it will hurt and she has been through so much today already.  Sitting on toilet for 15 minutes attempting to urinate with no success.  1715: Bladder scanned for 665 mls of urine.   1730: straight cathed for 1200 mls of urine without difficulty.  Fundus 2 below U with scant rubra.  1745 Transferred to room 427 with infant in arms.  Vital signs stable, report to Terri MYERS RN.

## 2022-04-29 NOTE — PROGRESS NOTES
Starting to feel uncomfortable with labor contractions.   Explained use of epidural PCEA dose and encouraged use.

## 2022-04-29 NOTE — L&D DELIVERY NOTE
OB Vaginal Delivery Note    David Clement MRN# [id number]   Age: 27 year old YOB: 1995     David became increasing uncomfortable following AROM. At approximately 1145 she was found to be a lip rim, complete and started actively pushing at 1200. David pushed with excellent maternal effort to and  of a viable infant female at 1255. NICU present for delivery d/t maternal ssri use, not needed. Baby placed on mother's abdomen, good tone and lusty cry. Cord clamped after 1 minute of pulsation, cut by FOB. Placenta delivered spontaneously and intact, trailing membranes were teased out with ring forceps. Vaginal exam revealed second degree perineal lacerations and left sulcus laceration, both of which were repaired in the usual fashion with 3-0 vicryl on CT-1. Bilateral periurethral lacerations noted, however hemostatic and thus not repaired. Bleeding sable, QBL pending at time of note. Mother and baby in stable condition.       GA: 40w6d  GP:   Labor Complications: None   EBL:   mL  Delivery QBL:    Delivery Type: Vaginal, Spontaneous   ROM to Delivery Time: (Delivered) Hours: 1 Minutes: 54  Bergoo Weight:     1 Minute 5 Minute 10 Minute   Apgar Totals: 8   9        ZOË HOOKS;SHEA GONZALEZ     Delivery Details:  David Clement, a 27 year old  female delivered a viable infant with apgars of 8  and 9 . Patient was fully dilated and pushing after   hours   minutes in active labor. Delivery was via vaginal, spontaneous  to a sterile field under epidural;intravenous  anesthesia. Infant delivered in vertex  right  occiput  anterior  position. Anterior and posterior shoulders delivered without difficulty. The cord was clamped, cut twice and 3 vessels  were noted. Cord blood was obtained in routine fashion with the following disposition: lab .      Cord complications: nuchal   Placenta delivered at 2022  1:06 PM . Placental disposition was Hospital disposal . Fundal massage  performed and fundus found to be firm.     Episiotomy: none    Perineum, vagina, cervix were inspected, and the following lacerations were noted:   Perineal lacerations: 2nd   periurethral laceration: bilateral    sulcus laceration: left         Any lacerations were repaired in the usual fashion using 3-0 vicryl on CT-1.    Excellent hemostasis was noted. Needle count correct. Infant and patient in delivery room in good and stable condition.        UrbanoRichardson [[id number]]    Labor Event Times    Dilation complete date: 22 Complete time: 12:00 PM   Start pushing date/time: 2022 1205      Labor Events     labor?: No   steroids: None     Rupture date/time: 22 1101   Rupture type: Artificial Rupture of Membranes  Fluid color: Clear  Fluid odor: Normal     Augmentation: Oxytocin, AROM  Indications for augmentation: Ineffective Contraction Pattern     Delivery/Placenta Date and Time    Delivery Date: 22 Delivery Time: 12:55 PM   Placenta Date/Time: 2022  1:06 PM  Oxytocin given at the time of delivery: after delivery of baby  Delivering clinician: Kiera Lentz CNM   Other personnel present at delivery:  Provider Role   Zeina Julio, Flo Troy RN          Vaginal Counts     Initial count performed by 2 team members:  Two Team Members   kiera julio rn        [place] Suture Needles Sponges (RETIRED) Instruments   Initial counts 0 0 5    Added to count 2 2 5    Relief counts       Final counts 2 2 10          Placed during labor Accounted for at the end of labor   FSE No NA   IUPC No NA   Cervidil No NA              Final count performed by 2 team members:  Two Team Members   kiera julio rn       Final count correct?: Yes     Apgars    Living status: Living   1 Minute 5 Minute 10 Minute 15 Minute 20 Minute   Skin color: 0  1       Heart rate: 2  2       Reflex irritability: 2  2       Muscle tone: 2   2       Respiratory effort: 2  2       Total: 8  9       Apgars assigned by: IRINEO GONZALEZ RN     Cord    Vessels: 3 Vessels    Cord Complications: Nuchal   Nuchal Intervention: delivered through         Nuchal cord description: loose nuchal cord         Cord Blood Disposition: Lab    Gases Sent?: No    Delayed cord clamping?: Yes    Cord Clamping Delay (seconds): 31-60 seconds    Stem cell collection?: No       [place] Resuscitation    Methods: None   Care at Delivery: NICU team called by Kiera Lentz CNM to attend the vaginal delivery of a term infant with category 2 tracings being delivered to a mother on an selective serotonin reuptake inhibitor. Infant delivered with good tone and spontaneous lusty cry. Infant dried and stimulated on mothers chest with continued good respiratory effort and tone. Infant left in the care of the L&D team for normal  cares. PEE Leonardo, CNP-BC 2022 1:02 PM       Skin to Skin and Feeding Plan    Skin to skin initiation date/time: 1841    Skin to skin with: Mother  Skin to skin end date/time:        Labor Events and Shoulder Dystocia    Fetal Tracing Prior to Delivery: Category 2  Fetal Tracing Comments: variable decelerations in second stage, terminal bradycardia   Shoulder dystocia present?: Neg     Delivery (Maternal) (Provider to Complete) (020295)    Episiotomy: None  Perineal lacerations: 2nd Repaired?: Yes   Periurethral laceration: bilateral Repaired?: No   Sulcus laceration: left Repaired?: Yes   Repair suture: 3-0 Vicryl  Number of repair packets: 2  Genital tract inspection done: Pos     Blood Loss  Mother: David Clement #[id number]   Start of Mother's Information    Delivery Blood Loss  22 0055 - 22 1416    Delivery QBL (mL) Hospital Encounter 285 mL    Total  285 mL         End of Mother's Information  Mother: David Clement #[id number]          Delivery - Provider to Complete ([id number])    Delivering clinician:  Kiera Lentz, VINNY  CNM Care: Exclusive CNM care in labor  Attempted Delivery Types (Choose all that apply): Spontaneous Vaginal Delivery  Delivery Type (Choose the 1 that will go to the Birth History): Vaginal, Spontaneous                   Other personnel:  Provider Role   Zeina Yap, Flo Troy RN                 Placenta    Date/Time: 2022  1:06 PM  Removal: Spontaneous  Comments: 3VC, intact, chen mechanism   Disposition: Hospital disposal           Anesthesia    Method: Epidural, INTRAVENOUS   Cervical dilation at placement: 0-3                Presentation and Position    Presentation: Vertex    Position: Right Occiput Anterior                 Assessment/Plan:   1. : routine pp cares, hemoglobin tomorrow AM, anticipate discharge PPD #2  2. Lactating mother: lactation support and consultation prn   3. Anxiety: continue with Celexa as prescribed, plan for 2 wk pp mood check     PEE Gamez, MEGANM

## 2022-04-29 NOTE — PLAN OF CARE
CNM discussed plan of care with patient, and admit orders received. Report given to Radha MAYO RN.

## 2022-04-29 NOTE — TELEPHONE ENCOUNTER
OB Triage Call    Is patient's OB/Midwife with the [facility] or [facility]? [facility]- Proceed with triage     Reason for call: Contractions    Assessment: David is 40w6d pregnant    Having contractions 5-6 min apart since 9:45 pm   Contractions started ~6 pm tonight    Plan: L&D advised - Pt followed by [facility]. Notified that on-call provider would be paged & RN will call back.    1:09 am - Smart web page sent to on-call provider, Keagan Rodrigues:  [facility]  [phone]  Pt: David Clement  : [birth year]  Cx's q5m since 9:45pm; Baby active; Denies bleeding, leaking fluid  MRN: [id number]  VINNY: Lisa    1:10 am - Spoke to Naomi at [facility] L&D; Report given; Will await call from VINNY    1:19 am - Smart web page sent to on-call provider, Keagan Rodrigues:  [facility]  [phone]  Pt: David Clement  : [birth year]  Cx's q5m since 9:45pm; Baby active; Denies bleeding, leaking fluid  MRN: [id number]  VINNY: Lisa    1:20 am - Call received from L&D.  MEGANM will call back shortly - Currently finishing a procedure with another pt.    1:29 am - Call received from Keagan Rodrigues CNM. She will call pt directly.    Patient plans to deliver at [facility]    Patient's primary OB Provider is VINNY Gonzalez.      Per protocol recommendations Consult needed for [facility] MD or Midwife.  Patient's primary OB is [facility]. Paged on-call midwife for patient's primary OB clinic (refer to where patient is seen as midwives may go to multiple locations) JOSELYN Rodrigues CNM to call FNA back at [facility] SACHIN Brenner.  Call returned at 1:29 and advised on triage assessment. Does midwife recommend L&D evaluation? Unknown- VINNY contacted patient directly to develop plan of care. No further actions needed at this time.     Is patient's delivering hospital on divert? No      40w6d    Estimated Date of Delivery: 2022        OB History    Para Term  AB Living   1 0 0 0 0 0   SAB IAB Ectopic Multiple Live Births   0 0 0 0 0       # Outcome Date GA Lbr Herson/2nd Weight Sex Delivery Anes PTL Lv   1 Current                No results found for: GBS       COVID 19 Nurse Triage Plan/Patient Instructions    Please be aware that novel coronavirus (COVID-19) may be circulating in the community. If you develop symptoms such as fever, cough, or SOB or if you have concerns about the presence of another infection including coronavirus (COVID-19), please contact your health care provider or visit [email or web address].     Disposition/Instructions    ED Visit recommended. Follow protocol based instructions.     Bring Your Own Device:  Please also bring your smart device(s) (smart phones, tablets, laptops) and their charging cables for your personal use and to communicate with your care team during your visit.    Thank you for taking steps to prevent the spread of this virus.  o Limit your contact with others.  o Wear a simple mask to cover your cough.  o Wash your hands well and often.    Resources    [facility]: About COVID-19: [email or web address]    CDC: What to Do If You're Sick: www.cdc.gov/coronavirus/2019-ncov/about/steps-when-sick.html    CDC: Ending Home Isolation: www.cdc.gov/coronavirus/2019-ncov/hcp/disposition-in-home-patients.html     CDC: Caring for Someone: www.cdc.gov/coronavirus/2019-ncov/if-you-are-sick/care-for-someone.html     [facility]: Interim Guidance for Hospital Discharge to Home: www.health.[facility].mn.us/diseases/coronavirus/hcp/hospdischarge.pdf    [facility] clinical trials (COVID-19 research studies): clinicalaffairs.[facility]/n-clinical-trials     Below are the COVID-19 hotlines at the [place] Department of [facility] ([facility]). Interpreters are available.   o For health questions: Call 826-927-2398 or 1-556.715.3093 (7 a.m. to 7 p.m.)  o For questions about schools and childcare: Call 254-921-4829 or 1-947.973.8898 (7 a.m. to 7 p.m.)     Ирина Brenner RN  [facility] Nurse  "Advisors      Reason for Disposition    [1] First baby (primipara) AND [2] contractions < 6 minutes apart  AND [3] present 2 hours    Additional Information    Negative: Passed out (i.e., lost consciousness, collapsed and was not responding)    Negative: Shock suspected (e.g., cold/pale/clammy skin, too weak to stand, low BP, rapid pulse)    Negative: Difficult to awaken or acting confused (e.g., disoriented, slurred speech)    Negative: [1] SEVERE abdominal pain (e.g., excruciating) AND [2] constant AND [3] present > 1 hour    Negative: Severe bleeding (e.g., continuous red blood from vagina, or large blood clots)    Negative: Umbilical cord hanging out of the vagina (shiny, white, curled appearance, \"like telephone cord\")    Negative: Uncontrollable urge to push (i.e., feels like baby is coming out now)    Negative: Can see baby    Negative: Sounds like a life-threatening emergency to the triager    Protocols used: PREGNANCY - LABOR-A-AH      "

## 2022-04-29 NOTE — ANESTHESIA PROCEDURE NOTES
Epidural catheter Procedure Note    Pre-Procedure   Staff -        Anesthesiologist:  Refugio Morales MD       Performed By: anesthesiologist       Location: OB       Pre-Anesthestic Checklist: patient identified, IV checked, site marked, risks and benefits discussed, informed consent, monitors and equipment checked, pre-op evaluation and at physician/surgeon's request  Timeout:       Correct Patient: Yes        Correct Procedure: Yes        Correct Site: Yes        Correct Position: Yes   Procedure Documentation  Procedure: epidural catheter       Patient Position: sitting       Skin prep: Betadine       Local skin infiltrated with 1 mL of 1% lidocaine.        Insertion Site: L3-4. (midline approach).       Technique: LORT saline and LORT air        Needle Type: Touhy needle       Needle Gauge: 17.        Needle Length (Inches): 3.5        Catheter: 19 G.          Catheter threaded easily.             # of attempts: 1 and  # of redirects:     Assessment/Narrative         Paresthesias: No.       Test dose of 3 mL lidocaine 1.5% w/ 1:200,000 epinephrine at.         Test dose negative, 3 minutes after injection, for signs of intravascular, subdural, or intrathecal injection.       Insertion/Infusion Method: LORT saline and LORT air       Aspiration negative for Heme or CSF via Epidural Catheter.     Comments:  Pre-procedure time out completed. Patient in sitting position, the lumbar spine was prepped and draped in sterile fashion. The L3/L4 interspace was identified and local anesthetic was injected for local skin infiltration. A 17 G touhy needle was advanced to the epidural space which was confirmed with the loss of resistance technique at 5.5 cm. A catheter was then advanced easily into the epidural space. The catheter was left at 9.5 cm at the skin. Negative aspiration of blood and CSF was confirmed. A test dose of 1.5% lidocaine with 1:200,000 epinephrine was injected through the catheter and was  negative for intravascular injection. The site was covered with sterile tegaderm and the catheter was secured with tape.    Orders to manage the epidural infusion have been entered and, through coordination with the nurse, we will continue to manage and monitor the patient's labor epidural.  We will continuously be available to adjust as needed throughout the entire labor and delivery process.

## 2022-04-29 NOTE — PROGRESS NOTES
"CNM PROGRESS NOTE    SUBJECTIVE:  David is feeling a lot better after her epidural. She is resting now. She is surprised on how heavy contractions felt. She is grateful she is now able to relax. Her mother and West are in the room and are supportive.    OBJECTIVE:  /70   Temp 97.88  F (36.6  C)   Ht 1.778 m (5' 10\")   LMP 2021   BMI 29.70 kg/m       Elevated BP: 123/90 @ 0215    Fetal heart tones: Baseline 130   Variability: moderate  Accelerations: present  Decelerations: absent    Contractions: Pt is senthil every 2-7 minutes, lasting  seconds and palpates mild    Cervix: 2.5/ 60-70%/ -2, Vtx  ROM: not ruptured    Pitocin- 4 mu/min.  Antibiotics- none  Cervical ripening: N/A    ASSESSMENT:  IUP @ 40w6d early labor   GBS- negative  Pitocin for augmentation  Proteinuria: 0.21  Elevated BP without dx of hypertension       PLAN:   Continue with pitocin per protocol.   Encourage rest/ sleep.   Assess as clinically indicated.   Anticipate     Keagan Rodrigues, PEE CNM        "

## 2022-04-29 NOTE — PLAN OF CARE
Patient arrived around 0200, ambulated to MAC with significant other. Reports having ctx that started around 1800 and are currently Q5min. Denies any leaking fluid or bleeding. Consent received for external fetal monitors and SVE.     CNM came to bedside to assess patient and discuss plan of care. Patient decided to stay for 1hr and have cervix rechecked. CNM will come to bedside around 0330 to check cervix and evaluate plan of care.

## 2022-04-30 ENCOUNTER — MEDICAL CORRESPONDENCE (OUTPATIENT)
Dept: HEALTH INFORMATION MANAGEMENT | Facility: CLINIC | Age: 27
End: 2022-04-30
Payer: COMMERCIAL

## 2022-04-30 PROBLEM — D62 ANEMIA DUE TO BLOOD LOSS, ACUTE: Status: ACTIVE | Noted: 2022-04-30

## 2022-04-30 LAB — HGB BLD-MCNC: 9.6 G/DL (ref 11.7–15.7)

## 2022-04-30 PROCEDURE — 250N000013 HC RX MED GY IP 250 OP 250 PS 637: Performed by: NURSE PRACTITIONER

## 2022-04-30 PROCEDURE — 85018 HEMOGLOBIN: CPT | Performed by: ADVANCED PRACTICE MIDWIFE

## 2022-04-30 PROCEDURE — 120N000012 HC R&B POSTPARTUM

## 2022-04-30 PROCEDURE — 36415 COLL VENOUS BLD VENIPUNCTURE: CPT | Performed by: ADVANCED PRACTICE MIDWIFE

## 2022-04-30 PROCEDURE — 250N000013 HC RX MED GY IP 250 OP 250 PS 637: Performed by: ADVANCED PRACTICE MIDWIFE

## 2022-04-30 RX ORDER — FERROUS GLUCONATE 324(38)MG
324 TABLET ORAL
Status: DISCONTINUED | OUTPATIENT
Start: 2022-04-30 | End: 2022-05-01 | Stop reason: HOSPADM

## 2022-04-30 RX ORDER — NALOXONE HYDROCHLORIDE 0.4 MG/ML
0.4 INJECTION, SOLUTION INTRAMUSCULAR; INTRAVENOUS; SUBCUTANEOUS
Status: DISCONTINUED | OUTPATIENT
Start: 2022-04-30 | End: 2022-05-01 | Stop reason: HOSPADM

## 2022-04-30 RX ORDER — NALOXONE HYDROCHLORIDE 0.4 MG/ML
0.2 INJECTION, SOLUTION INTRAMUSCULAR; INTRAVENOUS; SUBCUTANEOUS
Status: DISCONTINUED | OUTPATIENT
Start: 2022-04-30 | End: 2022-05-01 | Stop reason: HOSPADM

## 2022-04-30 RX ORDER — CITALOPRAM HYDROBROMIDE 20 MG/1
40 TABLET ORAL DAILY
Status: DISCONTINUED | OUTPATIENT
Start: 2022-05-01 | End: 2022-05-01 | Stop reason: HOSPADM

## 2022-04-30 RX ORDER — PRENATAL VIT/IRON FUM/FOLIC AC 27MG-0.8MG
1 TABLET ORAL
Status: DISCONTINUED | OUTPATIENT
Start: 2022-05-01 | End: 2022-05-01 | Stop reason: HOSPADM

## 2022-04-30 RX ORDER — MULTIVIT WITH MINERALS/LUTEIN
250 TABLET ORAL DAILY
Status: DISCONTINUED | OUTPATIENT
Start: 2022-04-30 | End: 2022-05-01 | Stop reason: HOSPADM

## 2022-04-30 RX ADMIN — IBUPROFEN 800 MG: 400 TABLET ORAL at 22:41

## 2022-04-30 RX ADMIN — ACETAMINOPHEN 650 MG: 325 TABLET ORAL at 09:30

## 2022-04-30 RX ADMIN — IBUPROFEN 800 MG: 400 TABLET ORAL at 09:29

## 2022-04-30 RX ADMIN — ACETAMINOPHEN 650 MG: 325 TABLET ORAL at 22:41

## 2022-04-30 RX ADMIN — ACETAMINOPHEN 650 MG: 325 TABLET ORAL at 16:22

## 2022-04-30 RX ADMIN — DOCUSATE SODIUM 100 MG: 100 CAPSULE, LIQUID FILLED ORAL at 09:29

## 2022-04-30 RX ADMIN — IBUPROFEN 800 MG: 400 TABLET ORAL at 02:55

## 2022-04-30 RX ADMIN — Medication 250 MG: at 13:49

## 2022-04-30 RX ADMIN — IBUPROFEN 800 MG: 400 TABLET ORAL at 16:22

## 2022-04-30 RX ADMIN — FERROUS GLUCONATE 324 MG: 324 TABLET ORAL at 13:49

## 2022-04-30 RX ADMIN — ACETAMINOPHEN 650 MG: 325 TABLET ORAL at 02:55

## 2022-04-30 RX ADMIN — Medication 500 MCG: at 13:49

## 2022-04-30 ASSESSMENT — ACTIVITIES OF DAILY LIVING (ADL)
ADLS_ACUITY_SCORE: 3

## 2022-04-30 NOTE — LACTATION NOTE
Initial visit.   Mary TATE RN, IBCLC already in room and assisting David with positioning and latch.   Will follow as needed.   Terri MARSHALLN, RN, PHN, RNC-MNN, IBCLC

## 2022-04-30 NOTE — PROGRESS NOTES
"Edgar Monroe CNM Progress Note: Postpartum Day #1    2022  10:51 AM    SUBJECTIVE:  Patient is stable and is tolerating acitivity well  Baby is rooming in  Complications since 2 hours post delivery: None  Pain is well controlled.  Patient is taking pain medications.  Breastfeeding status:initiated and latching difficulties   Elimination:  She is voiding without difficulty.  She has not had a bowel movement  Desired contraception Unsure  Denies heavy bleeding and passing large clots.  Feels good about birth experience.    OBJECTIVE:  /60   Pulse 104   Temp 98.1  F (36.7  C) (Oral)   Resp 16   Ht 1.778 m (5' 10\")   LMP 2021   SpO2 98%   Breastfeeding Unknown   BMI 29.70 kg/m      Constitutional: healthy, alert and no distress    Breasts: Currently breastfeeding    Fundus: Uterine fundus is firm, non-tender and at 1FB below the level of the umbilicus    Perineum: Perineum is well approximated, minimal swelling    Lochia: Lochia is appropriate for the duration of time since delivery.     ASSESSMENT:  PPD #1    Doing well.  Normal healing wound.  No excessive bleeding  Pain well-controlled.  Hemoglobin   Date Value Ref Range Status   2022 9.6 (L) 11.7 - 15.7 g/dL Final   ]      PLAN:  Continue routine care  Iron supplementation  Breast feeding strategies discussed  Lactation consultation  Reviewed breastfeeding  Reviewed postpartum warning signs  Reviewed postpartum blues and postpartum depression warning signs  Reviewed postpartum care plan including MyChart check-in within one week, 2 week and 6 week visits.  Plans unsure for contraception postpartum  Anticipated discharge 2022        PEE Pablo CNM                  "

## 2022-04-30 NOTE — ANESTHESIA POSTPROCEDURE EVALUATION
Patient: David Clement    Procedure: * No procedures listed *       Anesthesia Type:  Epidural    Note:  Disposition: Inpatient   Postop Pain Control: Uneventful            Sign Out: Well controlled pain   PONV: No   Neuro/Psych: Uneventful            Sign Out: Acceptable/Baseline neuro status   Airway/Respiratory: Uneventful            Sign Out: Acceptable/Baseline resp. status   CV/Hemodynamics: Uneventful            Sign Out: Acceptable CV status; No obvious hypovolemia; No obvious fluid overload   Other NRE: NONE   DID A NON-ROUTINE EVENT OCCUR? No    Event details/Postop Comments:  Epidural has worn off without complications           Last vitals:  Vitals Value Taken Time   BP     Temp     Pulse     Resp     SpO2         Electronically Signed By: Monty Silva MD  April 30, 2022  5:36 PM

## 2022-04-30 NOTE — LACTATION NOTE
Initial visit with David, TADEO and baby.   Breastfeeding general information reviewed.   Advised to breastfeed exclusively, on demand, avoid pacifiers, bottles and formula unless medically indicated.  Encouraged rooming in, skin to skin, feeding on demand 8-12x/day or sooner if baby cues.  Explained benefits of holding and skin to skin.  Encouraged lots of skin to skin. Instructed on hand expression. Able to express 1/2 teaspoon and given to baby.  Then baby able to latch on well on the right breast with a shield.  Lips flanged and nutritive suckling noted.  Outpatient resources reviewed.   Instructed on signs/symptoms of engorgement/ plugged ducts and mastitis.  Instructed on comfort measures and when to call MD.    Continues to nurse well per mom. No further questions at this time.   Will follow as needed.   Terri MARSHALLN, RN, PHN, RNC-MNN, IBCLC

## 2022-04-30 NOTE — PLAN OF CARE
Vital signs stable. Fundus firm, midline with scant flow. Patient voiding without difficulty. Able to ambulate in room without dizziness. Taking tylenol/ibuprofen for pain management. Working on breastfeeding infant every 2-3 hours. Encouraged to call with questions/concerns. Will continue to monitor.

## 2022-04-30 NOTE — PLAN OF CARE
David is doing well this afternoon; breastfeeding improving and gaining more confidence. Right nipple everts with stimulation, though infant having difficulty latching to right side; introduced nipple shield with success. David showered this morning and up independently in room. Ambulated in [place] x1. C/o sore perineum; using ice and tucks. Hgb 9.6 today and started on oral iron supplementation. Supportive  and mother has been in room today.

## 2022-05-01 VITALS
SYSTOLIC BLOOD PRESSURE: 105 MMHG | TEMPERATURE: 97.3 F | RESPIRATION RATE: 18 BRPM | BODY MASS INDEX: 29.7 KG/M2 | HEART RATE: 89 BPM | DIASTOLIC BLOOD PRESSURE: 70 MMHG | HEIGHT: 70 IN | OXYGEN SATURATION: 98 %

## 2022-05-01 PROCEDURE — 250N000013 HC RX MED GY IP 250 OP 250 PS 637: Performed by: NURSE PRACTITIONER

## 2022-05-01 PROCEDURE — 250N000013 HC RX MED GY IP 250 OP 250 PS 637: Performed by: ADVANCED PRACTICE MIDWIFE

## 2022-05-01 RX ORDER — IBUPROFEN 800 MG/1
800 TABLET, FILM COATED ORAL EVERY 6 HOURS PRN
Qty: 60 TABLET | Refills: 1 | Status: SHIPPED | OUTPATIENT
Start: 2022-05-01 | End: 2022-05-31

## 2022-05-01 RX ORDER — DOCUSATE SODIUM 100 MG/1
100 CAPSULE, LIQUID FILLED ORAL 2 TIMES DAILY PRN
Qty: 28 CAPSULE | Refills: 0 | Status: SHIPPED | OUTPATIENT
Start: 2022-05-01 | End: 2022-05-15

## 2022-05-01 RX ORDER — MULTIVIT WITH MINERALS/LUTEIN
250 TABLET ORAL DAILY
Qty: 14 TABLET | Refills: 0 | Status: SHIPPED | OUTPATIENT
Start: 2022-05-02 | End: 2022-05-16

## 2022-05-01 RX ORDER — HYDROCORTISONE 2.5 %
CREAM (GRAM) TOPICAL 3 TIMES DAILY PRN
Qty: 3.5 G | Refills: 0 | Status: SHIPPED | OUTPATIENT
Start: 2022-05-01 | End: 2022-05-15

## 2022-05-01 RX ORDER — ACETAMINOPHEN 325 MG/1
650 TABLET ORAL EVERY 4 HOURS PRN
Qty: 60 TABLET | Refills: 0 | Status: SHIPPED | OUTPATIENT
Start: 2022-05-01 | End: 2022-05-15

## 2022-05-01 RX ORDER — FERROUS GLUCONATE 324(38)MG
324 TABLET ORAL
Qty: 14 TABLET | Refills: 0 | Status: SHIPPED | OUTPATIENT
Start: 2022-05-02 | End: 2022-05-16

## 2022-05-01 RX ORDER — MODIFIED LANOLIN
OINTMENT (GRAM) TOPICAL
COMMUNITY
Start: 2022-05-01 | End: 2022-06-07

## 2022-05-01 RX ADMIN — Medication 250 MG: at 09:15

## 2022-05-01 RX ADMIN — BENZOCAINE: 11.4 AEROSOL, SPRAY TOPICAL at 07:26

## 2022-05-01 RX ADMIN — IBUPROFEN 800 MG: 400 TABLET ORAL at 07:18

## 2022-05-01 RX ADMIN — Medication 500 MCG: at 09:15

## 2022-05-01 RX ADMIN — FERROUS GLUCONATE 324 MG: 324 TABLET ORAL at 09:15

## 2022-05-01 RX ADMIN — ACETAMINOPHEN 650 MG: 325 TABLET ORAL at 13:15

## 2022-05-01 RX ADMIN — PRENATAL VITAMINS-IRON FUMARATE 27 MG IRON-FOLIC ACID 0.8 MG TABLET 1 TABLET: at 09:15

## 2022-05-01 RX ADMIN — IBUPROFEN 800 MG: 400 TABLET ORAL at 13:15

## 2022-05-01 RX ADMIN — ACETAMINOPHEN 650 MG: 325 TABLET ORAL at 07:18

## 2022-05-01 RX ADMIN — DOCUSATE SODIUM 100 MG: 100 CAPSULE, LIQUID FILLED ORAL at 07:26

## 2022-05-01 ASSESSMENT — ACTIVITIES OF DAILY LIVING (ADL)
ADLS_ACUITY_SCORE: 3

## 2022-05-01 NOTE — PLAN OF CARE
Vital signs stable. Patient voiding without difficulty. Taking tylenol/ibuprofen for pain management. Working on breastfeeding infant every 2-3 hours with nipple shield and hand expressing occasionally. Encouraged to call with questions/concerns. Will continue to monitor.

## 2022-05-01 NOTE — DISCHARGE SUMMARY
[facility] Discharge Summary    David Clement MRN# [id number]   Age: 27 year old YOB: 1995     Date of Admission:  2022  Date of Discharge::  2022  Admitting Physician:  PEE Gallardo CNM  Discharge Physician:  PEE Pablo CNM     Home clinic: [facility] for [facility]          Admission Diagnoses:   Encounter for triage in pregnant patient [Z36.89]  Indication for care in labor or delivery [O75.9]          Discharge Diagnosis:   Normal spontaneous vaginal delivery  Lactating mother  Anemia due to acute blood loss  YAJAIRA          Procedures:   Procedure(s): Repair of second degree perineal laceration       No other procedures performed during this admission           Medications Prior to Admission:     Medications Prior to Admission   Medication Sig Dispense Refill Last Dose     citalopram (CELEXA) 40 MG tablet Take 40 mg by mouth daily   2022 at Unknown time     Prenatal Vit-Fe Fumarate-FA (PRENATAL VITAMINS PO) Take 1 tablet by mouth   2022 at Unknown time     [DISCONTINUED] Doxylamine Succinate, Sleep, (UNISOM PO)    2022 at Unknown time     [DISCONTINUED] hydrOXYzine (VISTARIL) 50 MG capsule Take 1 capsule (50 mg) by mouth nightly as needed for other (sleep) 15 capsule 0 2022 at Unknown time     [DISCONTINUED] metoclopramide (REGLAN) 5 MG tablet Take 1-2 tablets (5-10 mg) by mouth 4 times daily as needed (nausea) 56 tablet 0 2022 at Unknown time             Discharge Medications:     Current Discharge Medication List      START taking these medications    Details   acetaminophen (TYLENOL) 325 MG tablet Take 2 tablets (650 mg) by mouth every 4 hours as needed for mild pain or fever (take for pain of fever)  Qty: 60 tablet, Refills: 0    Associated Diagnoses: Lactating mother; Anemia due to blood loss, acute;  (normal spontaneous vaginal delivery)      benzocaine (AMERICAINE) 20 % external  aerosol Spray to affected area as needed  Qty: 57 g, Refills: 0    Associated Diagnoses: Lactating mother; Anemia due to blood loss, acute;  (normal spontaneous vaginal delivery)      cyanocobalamin (VITAMIN B-12) 500 MCG SUBL sublingual tablet Place 1 tablet (500 mcg) under the tongue daily for 14 days  Qty: 14 tablet, Refills: 0    Associated Diagnoses: Lactating mother; Anemia due to blood loss, acute;  (normal spontaneous vaginal delivery)      docusate sodium (COLACE) 100 MG capsule Take 1 capsule (100 mg) by mouth 2 times daily as needed for constipation  Qty: 28 capsule, Refills: 0    Associated Diagnoses: Lactating mother; Anemia due to blood loss, acute;  (normal spontaneous vaginal delivery)      ferrous gluconate (FERGON) 324 (38 Fe) MG tablet Take 1 tablet (324 mg) by mouth daily (with breakfast) for 14 days  Qty: 14 tablet, Refills: 0    Associated Diagnoses: Lactating mother; Anemia due to blood loss, acute;  (normal spontaneous vaginal delivery)      hydrocortisone 2.5 % cream Place rectally 3 times daily as needed (hemorrhoids)  Qty: 3.5 g, Refills: 0    Associated Diagnoses: Lactating mother; Anemia due to blood loss, acute;  (normal spontaneous vaginal delivery)      ibuprofen (ADVIL/MOTRIN) 800 MG tablet Take 1 tablet (800 mg) by mouth every 6 hours as needed for other (cramping)  Qty: 60 tablet, Refills: 1    Associated Diagnoses: Lactating mother; Anemia due to blood loss, acute;  (normal spontaneous vaginal delivery)      lanolin ointment Apply topically every hour as needed for other (sore nipples)    Associated Diagnoses: Lactating mother; Anemia due to blood loss, acute;  (normal spontaneous vaginal delivery)      vitamin C (ASCORBIC ACID) 250 MG tablet Take 1 tablet (250 mg) by mouth daily for 14 days  Qty: 14 tablet, Refills: 0    Associated Diagnoses: Lactating mother; Anemia due to blood loss, acute;  (normal spontaneous vaginal delivery)         CONTINUE  these medications which have NOT CHANGED    Details   citalopram (CELEXA) 40 MG tablet Take 40 mg by mouth daily      Prenatal Vit-Fe Fumarate-FA (PRENATAL VITAMINS PO) Take 1 tablet by mouth         STOP taking these medications       Doxylamine Succinate, Sleep, (UNISOM PO) Comments:   Reason for Stopping:         hydrOXYzine (VISTARIL) 50 MG capsule Comments:   Reason for Stopping:         metoclopramide (REGLAN) 5 MG tablet Comments:   Reason for Stopping:                     Consultations:   Consultation during this admission received from lactating          Brief History of Labor:   David presented to L&D in spontaneous onset of labor.  Labor was augmented with pitocin, progressed as anticipated for  of viable infant girl.             Hospital Course:   The patient's hospital course was unremarkable.  On discharge, her pain was well controlled. Vaginal bleeding is similar to peak menstrual flow.  Voiding without difficulty.  Ambulating well and tolerating a normal diet.  No fever.  Breastfeeding well.  Infant is stable.  No bowel movement yet.  She was discharged on post-partum day #2.    Post-partum hemoglobin:   Hemoglobin   Date Value Ref Range Status   2022 9.6 (L) 11.7 - 15.7 g/dL Final             Discharge Instructions and Follow-Up:   Discharge diet: Regular   Discharge activity: No sex for 6 week(s)   Discharge follow-up: Follow up with any CNM in 2 and 6 weeks   Wound care: Drink plenty of fluids  Ice to area for comfort           Discharge Disposition:   Discharged to home      Attestation:  I have reviewed today's vital signs, notes, medications, labs and imaging.  Face-to-face time: 20 minutes    PEE Pablo CNM

## 2022-05-01 NOTE — PLAN OF CARE
David c/o increased burning to perineum this morning; prn benzocaine spray given. Continues to use ice/tucks pads as needed. Breastfeeding has improved overnight; continuing to use nipple shield as infant gets very fussy when attempting to latch. Breasts appear to be filling and colostrum easily expressed. Fundus firm with scant- light rubra lochia. Supportive  and mother present. Mother will be staying with them this week after discharge. Discharge instructions reviewed and they verbalize understanding. Discharge medications given.

## 2022-05-01 NOTE — PROGRESS NOTES
"CNM Postpartum Discharge Note    SIGNIFICANT PROBLEMS:  Patient Active Problem List    Diagnosis Date Noted      (normal spontaneous vaginal delivery) 2022     Priority: Medium     Lactating mother 2022     Priority: Medium     Anemia due to blood loss, acute 2022     Priority: Medium     Encounter for triage in pregnant patient 2022     Priority: Medium     Indication for care in labor or delivery 2022     Priority: Medium     Anemia during pregnancy in third trimester 2022     Priority: Medium     Anxiety      Priority: Medium     Family history of chromosomal abnormality 2021     Priority: Medium     Family history of breast cancer 2021     Priority: Medium     Simple ovarian cyst 2021     Priority: Medium     21: Left ovarian simple cysts 1.70 x 2.03 x 1.23 cm & 2.05 x 1.49 x 1.63 cm       Supervision of high risk pregnancy in third trimester 2021     Priority: Medium     **HR  FOB: [place]   BONNIE: 2022  O+ anterior girl  NIPT @10w Tdap:          Flu: 10/17          GBS:                  YAJAIRA (generalized anxiety disorder) 10/25/2017     Priority: Medium         SUBJECTIVE:  Patient is stable and is tolerating acitivity well  Baby is rooming in  Complications since 2 hours post delivery: None  Pain is well controlled.  Patient is taking pain medications.  Breastfeeding status:initiated   Elimination:  She is voiding without difficulty.  She has not had a bowel movement  Desired contraception Unsure  Denies heavy bleeding and passing large clots.    INTERVAL HISTORY:  /70 (BP Location: Left arm)   Pulse 89   Temp 97.3  F (36.3  C) (Oral)   Resp 18   Ht 1.778 m (5' 10\")   LMP 2021   SpO2 98%   Breastfeeding Yes   BMI 29.70 kg/m      Constitutional: healthy, alert and no distress    Breasts: Currently breastfeeding    Fundus: Uterine fundus is firm, non-tender and at 1cm below the level of the umbilicus per RN " flowsheet    Perineum: Perineum is well approximated, minimal swelling    Lochia: Lochia is appropriate for the duration of time since delivery.     Postpartum hemoglobin   Hemoglobin   Date Value Ref Range Status   2022 9.6 (L) 11.7 - 15.7 g/dL Final     Blood type O+  Rubella status immune  History of depression:  yes anxiety    ASSESSMENT/PLAN:  Normal postpartum course  Stable Post-partum day #2  Complications:anemic, plan for iron supplementation and high risk for postpartum depression  Postpartum warning s/s reviewed, including bleeding/clots, fever, mastitis & thromboemboli   Exercise, diet and rest reviewed  PP Kegels/anant reviewed  Continue prenatal vitamins while breastfeeding  Birthcontrol planned:Undecided.   Educated on postpartum blues and postpartum depression warnings signs/symptoms  Follow-up in 2 and 6 weeks with CNMs at [facility] clinic  Plan d/c home today    Current Discharge Medication List      START taking these medications    Details   acetaminophen (TYLENOL) 325 MG tablet Take 2 tablets (650 mg) by mouth every 4 hours as needed for mild pain or fever (take for pain of fever)  Qty: 60 tablet, Refills: 0    Associated Diagnoses: Lactating mother; Anemia due to blood loss, acute;  (normal spontaneous vaginal delivery)      benzocaine (AMERICAINE) 20 % external aerosol Spray to affected area as needed  Qty: 57 g, Refills: 0    Associated Diagnoses: Lactating mother; Anemia due to blood loss, acute;  (normal spontaneous vaginal delivery)      cyanocobalamin (VITAMIN B-12) 500 MCG SUBL sublingual tablet Place 1 tablet (500 mcg) under the tongue daily for 14 days  Qty: 14 tablet, Refills: 0    Associated Diagnoses: Lactating mother; Anemia due to blood loss, acute;  (normal spontaneous vaginal delivery)      docusate sodium (COLACE) 100 MG capsule Take 1 capsule (100 mg) by mouth 2 times daily as needed for constipation  Qty: 28 capsule, Refills: 0     Associated Diagnoses: Lactating mother; Anemia due to blood loss, acute;  (normal spontaneous vaginal delivery)      ferrous gluconate (FERGON) 324 (38 Fe) MG tablet Take 1 tablet (324 mg) by mouth daily (with breakfast) for 14 days  Qty: 14 tablet, Refills: 0    Associated Diagnoses: Lactating mother; Anemia due to blood loss, acute;  (normal spontaneous vaginal delivery)      hydrocortisone 2.5 % cream Place rectally 3 times daily as needed (hemorrhoids)  Qty: 3.5 g, Refills: 0    Associated Diagnoses: Lactating mother; Anemia due to blood loss, acute;  (normal spontaneous vaginal delivery)      ibuprofen (ADVIL/MOTRIN) 800 MG tablet Take 1 tablet (800 mg) by mouth every 6 hours as needed for other (cramping)  Qty: 60 tablet, Refills: 1    Associated Diagnoses: Lactating mother; Anemia due to blood loss, acute;  (normal spontaneous vaginal delivery)      lanolin ointment Apply topically every hour as needed for other (sore nipples)    Associated Diagnoses: Lactating mother; Anemia due to blood loss, acute;  (normal spontaneous vaginal delivery)      vitamin C (ASCORBIC ACID) 250 MG tablet Take 1 tablet (250 mg) by mouth daily for 14 days  Qty: 14 tablet, Refills: 0    Associated Diagnoses: Lactating mother; Anemia due to blood loss, acute;  (normal spontaneous vaginal delivery)         CONTINUE these medications which have NOT CHANGED    Details   citalopram (CELEXA) 40 MG tablet Take 40 mg by mouth daily      Prenatal Vit-Fe Fumarate-FA (PRENATAL VITAMINS PO) Take 1 tablet by mouth         STOP taking these medications       Doxylamine Succinate, Sleep, (UNISOM PO) Comments:   Reason for Stopping:         hydrOXYzine (VISTARIL) 50 MG capsule Comments:   Reason for Stopping:         metoclopramide (REGLAN) 5 MG tablet Comments:   Reason for Stopping:               PEE Pablo CNM    20 minutes spent on the date of the encounter doing chart review, patient visit and  documentation

## 2022-05-03 ENCOUNTER — MEDICAL CORRESPONDENCE (OUTPATIENT)
Dept: HEALTH INFORMATION MANAGEMENT | Facility: CLINIC | Age: 27
End: 2022-05-03
Payer: COMMERCIAL

## 2022-05-06 ENCOUNTER — TELEPHONE (OUTPATIENT)
Dept: MIDWIFE SERVICES | Facility: CLINIC | Age: 27
End: 2022-05-06
Payer: COMMERCIAL

## 2022-05-06 NOTE — TELEPHONE ENCOUNTER
"4/29/22  Has not had terrible bleeding  10\" ago had a gush of blood, filled pad and went onto her underwear.  Then went to the bathroom and no further bleeding/gush or anything on her pad right now.  Denies dizziness.  She admits she has been up and on her feet more today for some appointments.  Reassured that bleeding can  w increase in activity now that she is home or when awakes in the morning. As long as no further bleeding, she can continue to monitor at home. Be seen in the ER if bleeding is heavy: changing a pad hourly for more than 1-2 hrs in a row, dizziness or not feeling well.    She feels fine, no cramping. She can always rub the uterus for cramping to ensure bleeding slows. Reviewed normal pp bleeding and length and when to be seen.    Pt verbalized understanding, in agreement with plan, and voiced no further questions.  Gisela Inman RN on 5/6/2022 at 2:51 PM    "

## 2022-05-10 NOTE — PROGRESS NOTES
"  SUBJECTIVE:     Midwife Postpartum 2 Week Visit    David Clement is a 27 year old      This visit took place in person      Delivery date was 2022. She had a Vaginal, Spontaneous  delivery of a viable girl, 40w6d named Selena, weight 8 pounds 10 oz., with complications of  None .    Information for the patient's :  Selena Nuno [[id number]]   8 lbs 10.63 oz      APGARs 8 , 9     Since delivery, she has been exclusively breastfeeding and breastfeeding and pumping.  Saw a lactation consultant through [facility], Selena had a lot of trouble latching. She recently switched to pumping and bottle feeding and is \"at peace\" with this decision. Selena is taking the bottle well. She has not had any signs of infection, she describes her lochia as minima She has not had other complications.      She is voiding and having bowel movements without difficulty.       Contraception was discussed, thinking she will try POP and then switch at some point to patch or COCs  She  has not had intercourse since delivery. She complains of No  perineal discomfort.     Mood is Stable. Taking 40 mg of Celexa, feels her anxiety is ok currently. Is having some anxiety surrounding COVID and family members wanting to visit.     Has a pap at [facility] just prior to conceiving. Reports it was normal but will get records.       ROS:  12 point review of systems negative other than symptoms noted below or in the HPI.       Current Outpatient Medications:      acetaminophen (TYLENOL) 325 MG tablet, Take 2 tablets (650 mg) by mouth every 4 hours as needed for mild pain or fever (take for pain of fever), Disp: 60 tablet, Rfl: 0     citalopram (CELEXA) 40 MG tablet, Take 40 mg by mouth daily, Disp: , Rfl:      cyanocobalamin (VITAMIN B-12) 500 MCG SUBL sublingual tablet, Place 1 tablet (500 mcg) under the tongue daily for 14 days, Disp: 14 tablet, Rfl: 0     ibuprofen (ADVIL/MOTRIN) 800 MG tablet, Take 1 tablet (800 " mg) by mouth every 6 hours as needed for other (cramping), Disp: 60 tablet, Rfl: 1     Prenatal Vit-Fe Fumarate-FA (PRENATAL VITAMINS PO), Take 1 tablet by mouth, Disp: , Rfl:      benzocaine (AMERICAINE) 20 % external aerosol, Spray to affected area as needed (Patient not taking: Reported on 2022), Disp: 57 g, Rfl: 0     docusate sodium (COLACE) 100 MG capsule, Take 1 capsule (100 mg) by mouth 2 times daily as needed for constipation (Patient not taking: Reported on 2022), Disp: 28 capsule, Rfl: 0     ferrous gluconate (FERGON) 324 (38 Fe) MG tablet, Take 1 tablet (324 mg) by mouth daily (with breakfast) for 14 days (Patient not taking: Reported on 2022), Disp: 14 tablet, Rfl: 0     hydrocortisone 2.5 % cream, Place rectally 3 times daily as needed (hemorrhoids) (Patient not taking: Reported on 2022), Disp: 3.5 g, Rfl: 0     lanolin ointment, Apply topically every hour as needed for other (sore nipples) (Patient not taking: Reported on 2022), Disp: , Rfl:      vitamin C (ASCORBIC ACID) 250 MG tablet, Take 1 tablet (250 mg) by mouth daily for 14 days (Patient not taking: Reported on 2022), Disp: 14 tablet, Rfl: 0.   OB History    Para Term  AB Living   1 1 1 0 0 1   SAB IAB Ectopic Multiple Live Births   0 0 0 0 1      # Outcome Date GA Lbr Herson/2nd Weight Sex Delivery Anes PTL Lv   1 Term 22 40w6d / 00:55 3.93 kg (8 lb 10.6 oz) F Vag-Spont EPI, IV N LESLIE      Name: IRMA ARGUETA      Apgar1: 8  Apgar5: 9     Last [facility]: at [facility] in , negative per pt report. Will get records   Hgb in hospital was 9.6    EXAM:  BP 98/58   Wt 80.9 kg (178 lb 6.4 oz)   LMP 2021   Breastfeeding Yes   BMI 25.60 kg/m    BMI: Body mass index is 25.6 kg/m .  Constitutional: healthy, alert and no distress  Chest: breathing unlabored  Breast: deferred, patient lactating.      ASSESSMENT:   Normal postpartum exam after  Vaginal, Spontaneous .    ICD-10-CM    1.  Routine postpartum follow-up  Z39.2          PLAN:  No results found for any visits on 05/13/22.    Teaching: exercise, birth control and mental health  Family Planning:mini pill / progesterone only pill  Encourage Kegels and abdominal exercise.  Continue a multivitamin/prenatal supplement, especially if breastfeeding.  Postpartum Hgb was not done today.    GDM:  Fasting and 2hr GCT needed:  No     Return at 6 weeks postpartum for physical and pelvic exam.    PEE Gamez, CNM

## 2022-05-13 ENCOUNTER — PRENATAL OFFICE VISIT (OUTPATIENT)
Dept: MIDWIFE SERVICES | Facility: CLINIC | Age: 27
End: 2022-05-13
Payer: COMMERCIAL

## 2022-05-13 VITALS — WEIGHT: 178.4 LBS | DIASTOLIC BLOOD PRESSURE: 58 MMHG | SYSTOLIC BLOOD PRESSURE: 98 MMHG | BODY MASS INDEX: 25.6 KG/M2

## 2022-05-13 PROCEDURE — 99024 POSTOP FOLLOW-UP VISIT: CPT | Performed by: ADVANCED PRACTICE MIDWIFE

## 2022-05-13 ASSESSMENT — ANXIETY QUESTIONNAIRES
IF YOU CHECKED OFF ANY PROBLEMS ON THIS QUESTIONNAIRE, HOW DIFFICULT HAVE THESE PROBLEMS MADE IT FOR YOU TO DO YOUR WORK, TAKE CARE OF THINGS AT HOME, OR GET ALONG WITH OTHER PEOPLE: SOMEWHAT DIFFICULT
3. WORRYING TOO MUCH ABOUT DIFFERENT THINGS: NOT AT ALL
1. FEELING NERVOUS, ANXIOUS, OR ON EDGE: SEVERAL DAYS
GAD7 TOTAL SCORE: 3
7. FEELING AFRAID AS IF SOMETHING AWFUL MIGHT HAPPEN: NOT AT ALL
5. BEING SO RESTLESS THAT IT IS HARD TO SIT STILL: NOT AT ALL
6. BECOMING EASILY ANNOYED OR IRRITABLE: NOT AT ALL
2. NOT BEING ABLE TO STOP OR CONTROL WORRYING: SEVERAL DAYS

## 2022-05-13 ASSESSMENT — PATIENT HEALTH QUESTIONNAIRE - PHQ9
SUM OF ALL RESPONSES TO PHQ QUESTIONS 1-9: 1
5. POOR APPETITE OR OVEREATING: SEVERAL DAYS

## 2022-05-14 ASSESSMENT — ANXIETY QUESTIONNAIRES: GAD7 TOTAL SCORE: 3

## 2022-06-06 NOTE — PROGRESS NOTES
"Midwife Postpartum 6 Week Visit    David Clement is a 27 year old here for a postpartum checkup.     Delivery date was 22. She had a  of a viable girl, named Selena, weight 8lb pounds 10.6 oz., with none and no complications      Since delivery, she has been pumping and feeding breast milk through a bottle been breast feeding.  She has not had any signs of infection, her lochia stopped after 5 weeks.  She has not had other complications.      She is voiding and having bowel movements without difficulty.      Contraception was discussed and patient desires unsure.   She has not had intercourse since delivery.   She complains of No  perineal discomfort.     Mood is Stable  Patient screened for postpartum depression.   Depression Rating was:   Last PHQ-9 score on record =   PHQ-9 SCORE 2022   PHQ-9 Total Score MyChart -   PHQ-9 Total Score 1     Last GAD7 score on record =   YAJAIRA-7 SCORE 2022   Total Score -   Total Score 0     Alcohol Score = 1    ROS:  12 point review of systems negative other than symptoms noted below or in the HPI.       Current Outpatient Medications:      citalopram (CELEXA) 40 MG tablet, Take 40 mg by mouth daily, Disp: , Rfl:      levonorgestrel (MIRENA) 20 MCG/DAY IUD, 1 each (20 mcg) by Intrauterine route once, Disp: , Rfl:      Prenatal Vit-Fe Fumarate-FA (PRENATAL VITAMINS PO), Take 1 tablet by mouth, Disp: , Rfl:      amphetamine-dextroamphetamine (ADDERALL) 20 MG tablet, , Disp: , Rfl:     Current Facility-Administered Medications:      levonorgestrel (MIRENA) 20 MCG/DAY IUD 20 mcg, 1 each, Intrauterine, Once, Keagan Rodrigues APRN CNM.   OB History    Para Term  AB Living   1 1 1 0 0 1   SAB IAB Ectopic Multiple Live Births   0 0 0 0 1      # Outcome Date GA Lbr Herson/2nd Weight Sex Delivery Anes PTL Lv   1 Term 22 40w6d / 00:55 3.93 kg (8 lb 10.6 oz) F Vag-Spont EPI, IV N LESLIE      Name: Selena \"Carmella\"      Apgar1: 8  Apgar5: 9     Last pap:  " No results found for: PAP  Hgb in hospital was 9.6     EXAM:  /68   Wt 78.9 kg (174 lb)   LMP 2021   Breastfeeding Yes   BMI 24.97 kg/m    BMI: Body mass index is 24.97 kg/m .  Constitutional: healthy, alert and no distress  Neck: symmetrical, thyroid normal size, no masses present, no lymphadenopathy present.   Breast:  patient lactating.  Abdomen: soft, non-tender, diastasis no FB's    PELVIC EXAM:  Vulva: No lesions, well healed, BUS WNL, no tenderness  Vagina: Moist, pink, discharge normal  well rugated, no lesions  Cervix:smooth, pink, no visible lesions  Uterus: Involuted to normal size, non-tender, no masses palpated  Ovaries: No masses palpated  Rectal exam: deferred      ASSESSMENT:   Normal postpartum exam after .    ICD-10-CM    1. Encounter for postpartum care and examination after delivery  Z39.2    2. Encounter for insertion of intrauterine contraceptive device  Z30.430 levonorgestrel (MIRENA) 20 MCG/DAY IUD     levonorgestrel (MIRENA) 20 MCG/DAY IUD 20 mcg     INSERTION INTRAUTERINE DEVICE         PLAN:  No results found for any visits on 22.    Return as needed or at time of next expected pap, pelvic, or breast exam.  Teaching: exercise and birth control  Family Planning:Mirena IUD  Encourage Kegels and abdominal exercise.  Continue a multivitamin/prenatal supplement, especially if breastfeeding.  Pap smear was not obtained today.  Postpartum Hgb was not done today.    GDM:  Fasting and 2hr GCT needed:  No    Return to clinic:  For annual exams    Keagan Rodrigues, DOMENIC, APRN, CNM        IUD Insertion:  CONSULT:    Is a pregnancy test required: No.  Was a consent obtained?  No    Subjective: David Clement is a 27 year old  presents for IUD and desires Mirena type IUD.    Patient has been given the opportunity to ask questions about all forms of birth control, including all options appropriate for David Clement. Discussed that no method of birth control,  except abstinence is 100% effective against pregnancy or sexually transmitted infection.     David Clement understands she may have the IUD removed at any time. IUD should be removed by a health care provider.    The entire insertion procedure was reviewed with the patient, including care after placement.    Patient's last menstrual period was 07/17/2021. Last sexual activity: before labor. No allergy to betadine or shellfish. Patient declines STD screening  No results found for: HCG      /68   Wt 78.9 kg (174 lb)   LMP 07/17/2021   Breastfeeding Yes   BMI 24.97 kg/m      Pelvic Exam:   EG/BUS: normal genital architecture without lesions, erythema or abnormal secretions.   Vagina: moist, pink, rugae with physiologic discharge and secretions  Cervix: multi-parous no lesions and pink, moist, closed, without lesion or CMT  Uterus: retroverted position, mobile, no pain  Adnexa: within normal limits and no masses, nodularity, tenderness    PROCEDURE NOTE: -- IUD Insertion    Reason for Insertion: contraception    Under sterile technique, cervix was visualized with speculum and prepped with Betadine solution swab x 3. Tenaculum was placed for stability. The uterus was gently straightened and sounded to 7.0 cm. IUD prepared for placement, and IUD inserted according to 's instructions without difficulty or significant resitance, and deployed at the fundus. The strings were visualized and trimmed to 3.5 cm from the external os. Tenaculum was removed and hemostasis noted. Speculum removed.  Patient tolerated procedure well.    Lot # [id number]   Exp: 2024/mar    EBL: minimal    Complications: none    ASSESSMENT:     ICD-10-CM    1. Encounter for postpartum care and examination after delivery  Z39.2    2. Encounter for insertion of intrauterine contraceptive device  Z30.430 levonorgestrel (MIRENA) 20 MCG/DAY IUD     levonorgestrel (MIRENA) 20 MCG/DAY IUD 20 mcg     INSERTION INTRAUTERINE DEVICE         PLAN:    Given 's handouts, including when to have IUD removed, list of danger s/sx, side effects and follow up recommended. Encouraged condom use for prevention of STD. Back up contraception advised for 7 days if progestin method. Advised to call for any fever, for prolonged or severe pain or bleeding, abnormal vaginal discharge, or unable to palpate strings. She was advised to use pain medications (ibuprofen) as needed for mild to moderate pain. Advised to follow-up in clinic in 4-6 weeks for IUD string check if unable to find strings or as directed by provider.     PEE Aden CNM

## 2022-06-07 ENCOUNTER — PRENATAL OFFICE VISIT (OUTPATIENT)
Dept: MIDWIFE SERVICES | Facility: CLINIC | Age: 27
End: 2022-06-07
Payer: COMMERCIAL

## 2022-06-07 VITALS — SYSTOLIC BLOOD PRESSURE: 120 MMHG | WEIGHT: 174 LBS | BODY MASS INDEX: 24.97 KG/M2 | DIASTOLIC BLOOD PRESSURE: 68 MMHG

## 2022-06-07 DIAGNOSIS — Z97.5 IUD (INTRAUTERINE DEVICE) IN PLACE: ICD-10-CM

## 2022-06-07 DIAGNOSIS — Z30.430 ENCOUNTER FOR INSERTION OF INTRAUTERINE CONTRACEPTIVE DEVICE: ICD-10-CM

## 2022-06-07 PROBLEM — Z36.89 ENCOUNTER FOR TRIAGE IN PREGNANT PATIENT: Status: RESOLVED | Noted: 2022-04-29 | Resolved: 2022-06-07

## 2022-06-07 PROBLEM — O99.013 ANEMIA DURING PREGNANCY IN THIRD TRIMESTER: Status: RESOLVED | Noted: 2022-02-07 | Resolved: 2022-06-07

## 2022-06-07 PROBLEM — O09.93 SUPERVISION OF HIGH RISK PREGNANCY IN THIRD TRIMESTER: Status: RESOLVED | Noted: 2021-09-16 | Resolved: 2022-06-07

## 2022-06-07 PROBLEM — D62 ANEMIA DUE TO BLOOD LOSS, ACUTE: Status: RESOLVED | Noted: 2022-04-30 | Resolved: 2022-06-07

## 2022-06-07 PROCEDURE — 58300 INSERT INTRAUTERINE DEVICE: CPT | Performed by: ADVANCED PRACTICE MIDWIFE

## 2022-06-07 PROCEDURE — 99207 PR POST PARTUM EXAM: CPT | Performed by: ADVANCED PRACTICE MIDWIFE

## 2022-06-07 RX ORDER — DEXTROAMPHETAMINE SACCHARATE, AMPHETAMINE ASPARTATE, DEXTROAMPHETAMINE SULFATE AND AMPHETAMINE SULFATE 5; 5; 5; 5 MG/1; MG/1; MG/1; MG/1
TABLET ORAL
COMMUNITY
Start: 2022-05-16

## 2022-06-07 ASSESSMENT — ANXIETY QUESTIONNAIRES
2. NOT BEING ABLE TO STOP OR CONTROL WORRYING: NOT AT ALL
5. BEING SO RESTLESS THAT IT IS HARD TO SIT STILL: NOT AT ALL
1. FEELING NERVOUS, ANXIOUS, OR ON EDGE: NOT AT ALL
GAD7 TOTAL SCORE: 0
GAD7 TOTAL SCORE: 0
IF YOU CHECKED OFF ANY PROBLEMS ON THIS QUESTIONNAIRE, HOW DIFFICULT HAVE THESE PROBLEMS MADE IT FOR YOU TO DO YOUR WORK, TAKE CARE OF THINGS AT HOME, OR GET ALONG WITH OTHER PEOPLE: NOT DIFFICULT AT ALL
3. WORRYING TOO MUCH ABOUT DIFFERENT THINGS: NOT AT ALL
6. BECOMING EASILY ANNOYED OR IRRITABLE: NOT AT ALL
7. FEELING AFRAID AS IF SOMETHING AWFUL MIGHT HAPPEN: NOT AT ALL

## 2022-06-07 ASSESSMENT — PATIENT HEALTH QUESTIONNAIRE - PHQ9
5. POOR APPETITE OR OVEREATING: NOT AT ALL
SUM OF ALL RESPONSES TO PHQ QUESTIONS 1-9: 1

## 2022-06-07 NOTE — PATIENT INSTRUCTIONS
Your Intrauterine Device (IUD)     What to watch for right after IUD placement:    Some women may experience uterine cramps, bleeding, and/or dizziness during and right after IUD placement.     To help minimize the cramps, you may take ibuprofen 600 mg with food. These symptoms should improve over the next 24 hours.  Mild cramping may be present for a few days after IUD placement. You may continue taking ibuprofen as directed if needed.    You may experience spotting or bleeding for the first few weeks to months after IUD placement.      Other side effects can include:  Anemia, hemorrhage, partial or complete expulsion of device, uterine or cervical perforation, embedding of IUD in the uterine wall, increased risk of pelvic inflammatory disease.  Women who become pregnant with an IUD in place are at higher risk of an ectopic pregnancy.  There is also a higher risk of miscarriage when pregnancy occurs with an IUD in place.    Use condoms or abstain from sex for 7 days after the insertion of your Mirena or Kyleena or Freya  IUD.  This is not necessary if you had a ParaGard IUD placed.    If you experience fever, chills, abdominal pain, worsening pelvic pain, dizziness, unusually heavy vaginal bleeding, suspected expulsion of device or foul smelling vaginal discharge please call the clinic for evaluation.    Please schedule an appointment at the clinic for a string check 4-6 weeks after IUD placement    Your periods may change (Freya/Kyleena/Mirena):    For the first 3 to 6 months, your monthly period may become irregular. You may also have frequent spotting or light bleeding. A few women have heavy bleeding during this time. After your body adjusts, the number of bleeding days is likely to decrease (but may remain irregular), and you may even find that your periods stop altogether for as long as Freya/Mirena is in place.  Your periods will return rapidly once the IUD is removed.      ParaGard IUD users:    ParaGard  IUD users may experience heavier than normal cycles while their IUD is in place, this is considered normal   Back-up contraception is not needed      Checking for your strings:    We encourage everyone with an IUD in place to check for their strings monthly    You may check your own IUD strings by inserting a finger into the vagina and feeling the strings as they exit the cervix.  The strings will initially feel firm, like fishing line, but will soften over a few weeks.  After the strings have softened, you or your partner should not be able to feel the strings during intercourse.     If the string length greatly changes or if you cannot feel your strings at all please make an appointment to see you midwife and use a backup method of contraception like a condom.    If you can feel something hard/plastic like the IUD may not be in the correct place. You should then see your healthcare provider to have the position confirmed with ultrasound.       Remember:    IUD's do not protect against HIV or STIs.  IUD's do not prevent the formation of ovarian cysts.  IUD's do not typically reduce acne or cause weight gain or mood changes.    For more information:  [email or web address]-us.com/    The ParaGard IUD is effective for 10 years.  The Freya IUD is effective for 3 years.  The Kyleena/Mirena IUD is effective for 7 years.  Your IUD can easily be removed by a healthcare professional at any time.    Do not try to remove the IUD yourself.      If you have questions or concerns please call:    [facility]   [phone]

## 2022-06-07 NOTE — PROGRESS NOTES
"  IUD Insertion:  CONSULT:    Is a pregnancy test required: {Pregnancy test required:297577}  Was a consent obtained?  {Yes/No:444568::\"Yes\"}    Subjective: David Clement is a 27 year old  presents for IUD and desires {OB IUD TYPE:965007} type IUD.    Patient has been given the opportunity to ask questions about all forms of birth control, including all options appropriate for David Clement. Discussed that no method of birth control, except abstinence is 100% effective against pregnancy or sexually transmitted infection.     David Clement understands she may have the IUD removed at any time. IUD should be removed by a health care provider.    The entire insertion procedure was reviewed with the patient, including care after placement.    Patient's last menstrual period was 2021. {last sex (Optional):943723}. No allergy to betadine or shellfish. {std screenin}  No results found for: HCG      /68   Wt 78.9 kg (174 lb)   LMP 2021   Breastfeeding Yes   BMI 24.97 kg/m      Pelvic Exam:   EG/BUS: normal genital architecture without lesions, erythema or abnormal secretions.   Vagina: moist, pink, rugae with physiologic discharge and secretions  Cervix: ***parous no lesions and pink, moist, closed, without lesion or CMT  Uterus: ***position, mobile, no pain  Adnexa: within normal limits and no masses, nodularity, tenderness    PROCEDURE NOTE: -- IUD Insertion    Reason for Insertion: {IUD reasons:720641::\"contraception\"}    {IUD pain:174379}  Under sterile technique, cervix was visualized with speculum and prepped with {cleansing agents:866719::\"Betadine\"} solution swab x 3. {IUD instruments:443863::\"Tenaculum\"} was placed for stability. The uterus was gently straightened and sounded to {IUD SOUNDING DEPTHS:538730} cm. IUD prepared for placement, and IUD inserted according to 's instructions without difficulty or significant resitance, and deployed at the fundus. " The strings were visualized and trimmed to {IUD SOUNDING DEPTHS:930757} cm from the external os. Tenaculum was removed and hemostasis noted. Speculum removed.  Patient tolerated procedure well.    Lot # ***  Exp: ***    EBL: minimal    Complications: none    ASSESSMENT:     ICD-10-CM    1. Encounter for insertion of intrauterine contraceptive device  Z30.430         PLAN:    Given 's handouts, including when to have IUD removed, list of danger s/sx, side effects and follow up recommended. Encouraged condom use for prevention of STD. Back up contraception advised for 7 days if progestin method. Advised to call for any fever, for prolonged or severe pain or bleeding, abnormal vaginal discharge, or unable to palpate strings. She was advised to use pain medications (ibuprofen) as needed for mild to moderate pain. Advised to follow-up in clinic in 4-6 weeks for IUD string check if unable to find strings or as directed by provider.     PEE Aden CNM

## 2022-07-19 NOTE — PROGRESS NOTES
"S: David Clement   is a 27 year old  here for IUD check.  She denies problems with the IUD other than irregular and persistent bleeding since placement. Reports bleeding every day but some days \"it's like a period\" and other days she \"does not bleed very much\". She has not attempted to check for the strings.    O: BP 98/60   Ht 1.778 m (5' 10\")   Wt 75.3 kg (166 lb)   Breastfeeding Yes   BMI 23.82 kg/m    PELVIC EXAM:  Vulva: No external lesions, normal hair distribution, no adenopathy, BUS WNL  Bimanual done. Strings palpated. No CMT.     ASSESSMENT:  1) IUD normally placed.     PLAN:  IUD check up    RTC when due for annual exam or suspected problems with her IUD such as abnormal bleeding, disappearance of the strings or feeling the IUD in her cervix or with any pain with intercourse, or abnormal discharge.  If she starts having increased cramping with menses,  ibuprofen 600-800mg po TID with food can be used.  If she decides that she wants to attempt pregnancy in the future, she will schedule an appointment to have the IUD removed.   Reviewed that the IUD will need to be replaced for reasons of effectiveness in 7 yrs.  Call in 6 weeks to get US scheduled if still experiencing irregular and persistent bleeding.    Keagan Rodrigues DNP, PEE, VINNY    [facility]    I was present with the VINNY student who participated in the documentation of the services provided. I have verified the history and personally performed the physical exam and medical decision making, as documented by the student and edited by me.     Noy Reyes, RADHA Student    07/20/22     Keagan Rodrigues DNP, PEE, VINNY      "

## 2022-07-20 ENCOUNTER — OFFICE VISIT (OUTPATIENT)
Dept: MIDWIFE SERVICES | Facility: CLINIC | Age: 27
End: 2022-07-20
Payer: COMMERCIAL

## 2022-07-20 VITALS
BODY MASS INDEX: 23.77 KG/M2 | WEIGHT: 166 LBS | DIASTOLIC BLOOD PRESSURE: 60 MMHG | SYSTOLIC BLOOD PRESSURE: 98 MMHG | HEIGHT: 70 IN

## 2022-07-20 DIAGNOSIS — Z30.431 IUD CHECK UP: Primary | ICD-10-CM

## 2022-07-20 PROCEDURE — 99207 PR PRENATAL VISIT: CPT | Performed by: ADVANCED PRACTICE MIDWIFE

## 2022-10-03 ENCOUNTER — HEALTH MAINTENANCE LETTER (OUTPATIENT)
Age: 27
End: 2022-10-03

## 2022-10-28 ENCOUNTER — MYC MEDICAL ADVICE (OUTPATIENT)
Dept: MIDWIFE SERVICES | Facility: CLINIC | Age: 27
End: 2022-10-28

## 2022-10-28 DIAGNOSIS — G43.109 MIGRAINE WITH AURA AND WITHOUT STATUS MIGRAINOSUS, NOT INTRACTABLE: Primary | ICD-10-CM

## 2022-11-04 ENCOUNTER — OFFICE VISIT (OUTPATIENT)
Dept: FAMILY MEDICINE | Facility: CLINIC | Age: 27
End: 2022-11-04
Payer: COMMERCIAL

## 2022-11-04 VITALS
BODY MASS INDEX: 22.58 KG/M2 | HEART RATE: 98 BPM | DIASTOLIC BLOOD PRESSURE: 64 MMHG | HEIGHT: 70 IN | SYSTOLIC BLOOD PRESSURE: 106 MMHG | RESPIRATION RATE: 14 BRPM | TEMPERATURE: 97.9 F | OXYGEN SATURATION: 99 % | WEIGHT: 157.7 LBS

## 2022-11-04 DIAGNOSIS — F41.1 GAD (GENERALIZED ANXIETY DISORDER): Primary | ICD-10-CM

## 2022-11-04 DIAGNOSIS — G43.109 MIGRAINE WITH AURA AND WITHOUT STATUS MIGRAINOSUS, NOT INTRACTABLE: ICD-10-CM

## 2022-11-04 PROCEDURE — 99204 OFFICE O/P NEW MOD 45 MIN: CPT | Performed by: FAMILY MEDICINE

## 2022-11-04 RX ORDER — RIZATRIPTAN BENZOATE 5 MG/1
5 TABLET, ORALLY DISINTEGRATING ORAL
Qty: 20 TABLET | Refills: 1 | Status: SHIPPED | OUTPATIENT
Start: 2022-11-04 | End: 2022-12-08

## 2022-11-04 RX ORDER — ESCITALOPRAM OXALATE 20 MG/1
20 TABLET ORAL DAILY
Qty: 30 TABLET | Refills: 1 | Status: SHIPPED | OUTPATIENT
Start: 2022-11-04 | End: 2022-12-06

## 2022-11-04 ASSESSMENT — ENCOUNTER SYMPTOMS
HEADACHES: 1
SLEEP DISTURBANCE: 0
CONSTITUTIONAL NEGATIVE: 1
NERVOUS/ANXIOUS: 1
GASTROINTESTINAL NEGATIVE: 1

## 2022-11-04 ASSESSMENT — PAIN SCALES - GENERAL: PAINLEVEL: NO PAIN (0)

## 2022-11-04 NOTE — PROGRESS NOTES
"  Assessment and Plan    (F41.1) YAJAIRA (generalized anxiety disorder)  (primary encounter diagnosis)  Comment: starting med, refer for CT  Plan: Adult Mental Health  Referral,         escitalopram (LEXAPRO) 20 MG tablet            (G43.109) Migraine with aura and without status migrainosus, not intractable  Comment: trial, if helpful may consider adding prophy med  Plan: rizatriptan (MAXALT-MLT) 5 MG ODT              RTC in 2w video    Pako Gregorio MD      Kim Hernandez is a 27 year old, presenting for the following health issues:  Headache        Not wanting shots right now    History of Present Illness       Headaches:   Since the patient's last clinic visit, headaches are: no change  The patient is getting headaches:  2-3 times a week  She is able to do normal daily activities when she has a migraine.  The patient is taking the following rescue/relief medications:  Ibuprofen (Advil, Motrin) and Excedrin   Patient states \"I get only a small amount of relief\" from the rescue/relief medications.   The patient is taking the following medications to prevent migraines:  No medications to prevent migraines  In the past 4 weeks, the patient has gone to an Urgent Care or Emergency Room 0 times times due to headaches.    She eats 4 or more servings of fruits and vegetables daily.She consumes 1 sweetened beverage(s) daily.She exercises with enough effort to increase her heart rate 20 to 29 minutes per day.  She exercises with enough effort to increase her heart rate 4 days per week.   She is taking medications regularly.     HA for about 3 weeks, first thing in the am, last about 1 hour.  Initially manifests as visual disturbance and will sometimes will develop into a \"full-blown\" headache.  This will happen about 50% of the time.  Feeling a bit foggy.  Does not wake with a headache.  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  HA is central frontal, over eyes. Will last " "about 30\" and resolve on it's own.  Has tried pain medication but doesn't use typically.    Does admit to lots of anxiety and stress and feels this is related.  Uses citalopram finds that it works pretty well.  Managed by psychiatry. Baby in May '22, still not sleeping well.  Not currently involved with counseling.  Has done CBT.      Review of Systems   Constitutional: Negative.    Eyes: Positive for visual disturbance.   Gastrointestinal: Negative.    Neurological: Positive for headaches.   Psychiatric/Behavioral: Negative for sleep disturbance. The patient is nervous/anxious.             Objective    /64 (BP Location: Right arm, Patient Position: Sitting, Cuff Size: Adult Regular)   Pulse 98   Temp 97.9  F (36.6  C) (Oral)   Resp 14   Ht 1.778 m (5' 10\")   Wt 71.5 kg (157 lb 11.2 oz)   LMP  (LMP Unknown)   SpO2 99%   Breastfeeding No   BMI 22.63 kg/m    Body mass index is 22.63 kg/m .  Physical Exam  Vitals and nursing note reviewed.   Constitutional:       General: She is not in acute distress.     Appearance: Normal appearance.   HENT:      Right Ear: Tympanic membrane, ear canal and external ear normal.      Left Ear: Tympanic membrane, ear canal and external ear normal.      Mouth/Throat:      Mouth: Oropharynx is clear and moist.      Pharynx: No oropharyngeal exudate.   Eyes:      Conjunctiva/sclera: Conjunctivae normal.   Cardiovascular:      Rate and Rhythm: Normal rate and regular rhythm.      Heart sounds: Normal heart sounds.   Pulmonary:      Effort: Pulmonary effort is normal.      Breath sounds: Normal breath sounds.   Lymphadenopathy:      Cervical: No cervical adenopathy.   Skin:     General: Skin is warm and dry.      Findings: No rash.   Neurological:      General: No focal deficit present.      Mental Status: She is alert and oriented to person, place, and time.      Cranial Nerves: Cranial nerves 2-12 are intact.   Psychiatric:         Mood and Affect: Mood normal.         " Behavior: Behavior normal.

## 2022-12-06 ENCOUNTER — VIRTUAL VISIT (OUTPATIENT)
Dept: FAMILY MEDICINE | Facility: CLINIC | Age: 27
End: 2022-12-06
Payer: COMMERCIAL

## 2022-12-06 ENCOUNTER — MYC MEDICAL ADVICE (OUTPATIENT)
Dept: FAMILY MEDICINE | Facility: CLINIC | Age: 27
End: 2022-12-06

## 2022-12-06 DIAGNOSIS — G43.109 MIGRAINE WITH AURA AND WITHOUT STATUS MIGRAINOSUS, NOT INTRACTABLE: ICD-10-CM

## 2022-12-06 DIAGNOSIS — G43.109 MIGRAINE WITH AURA AND WITHOUT STATUS MIGRAINOSUS, NOT INTRACTABLE: Primary | ICD-10-CM

## 2022-12-06 DIAGNOSIS — F41.1 GAD (GENERALIZED ANXIETY DISORDER): ICD-10-CM

## 2022-12-06 PROCEDURE — 99214 OFFICE O/P EST MOD 30 MIN: CPT | Mod: GT | Performed by: FAMILY MEDICINE

## 2022-12-06 RX ORDER — TOPIRAMATE 25 MG/1
25 TABLET, FILM COATED ORAL 2 TIMES DAILY
Qty: 45 TABLET | Refills: 0 | Status: SHIPPED | OUTPATIENT
Start: 2022-12-06 | End: 2023-01-02

## 2022-12-06 RX ORDER — CITALOPRAM HYDROBROMIDE 40 MG/1
40 TABLET ORAL DAILY
Qty: 90 TABLET | Refills: 1 | COMMUNITY
Start: 2022-12-06

## 2022-12-06 ASSESSMENT — ENCOUNTER SYMPTOMS
HEADACHES: 1
PSYCHIATRIC NEGATIVE: 1
PARESTHESIAS: 0
GASTROINTESTINAL NEGATIVE: 1
CONSTITUTIONAL NEGATIVE: 1
SEIZURES: 0

## 2022-12-06 NOTE — PROGRESS NOTES
"David is a 27 year old who is being evaluated via a billable video visit.      How would you like to obtain your AVS? MyChart  If the video visit is dropped, the invitation should be resent by: Text to cell phone: [phone]  Will anyone else be joining your video visit? No          Assessment and Plan    (G43.109) Migraine with aura and without status migrainosus, not intractable  (primary encounter diagnosis)  Comment: willing to try - advise starting at 25mg BID.  If no side effects, can decide to stay at this dose or increase to 50mg BID after two week  Plan: topiramate (TOPAMAX) 25 MG tablet            (F41.1) YAJAIRA (generalized anxiety disorder)  Comment:   Plan: citalopram (CELEXA) 40 MG tablet              RTC in 1m    Pako Gregorio MD      Subjective   David is a 27 year old, presenting for the following health issues:  No chief complaint on file.      History of Present Illness       Headaches:   Since the patient's last clinic visit, headaches are: improved  The patient is getting headaches:  Maybe 1 every 2 weeks  She is able to do normal daily activities when she has a migraine.  The patient is taking the following rescue/relief medications:  Ibuprofen (Advil, Motrin)   Patient states \"I get total relief\" from the rescue/relief medications.   The patient is taking the following medications to prevent migraines:  No medications to prevent migraines  In the past 4 weeks, the patient has gone to an Urgent Care or Emergency Room 0 times times due to headaches.    Reason for visit:  Check in for medications    She eats 2-3 servings of fruits and vegetables daily.She consumes 1 sweetened beverage(s) daily.She exercises with enough effort to increase her heart rate 10 to 19 minutes per day.  She exercises with enough effort to increase her heart rate 4 days per week.   She is taking medications regularly.       Medication Followup of Lexapro     Taking Medication as prescribed: yes    Side Effects:  " None    Medication Helping Symptoms:  Would like to go back to taking only Celexa. This is prescribed from her psychiatrist     [place] that she was a bit more anxious on the escitalopram, prefers citlaopram.  Is getting this through psychaitry.  Is still working on getting things set up for counseling.    Notes that she will be moving back to [place] this next summer.    Notes that rizatriptan seems to work well for at least reducing severity of migraines if not removing it entirely. Has using 1-2 times weekly.    Review of Systems   Constitutional: Negative.    Gastrointestinal: Negative.    Neurological: Positive for headaches. Negative for seizures and paresthesias.   Psychiatric/Behavioral: Negative.             Objective           Vitals:  No vitals were obtained today due to virtual visit.    Physical Exam   GENERAL: Healthy, alert and no distress  EYES: Eyes grossly normal to inspection.  No discharge or erythema, or obvious scleral/conjunctival abnormalities.  RESP: No audible wheeze, cough, or visible cyanosis.  No visible retractions or increased work of breathing.    SKIN: Visible skin clear. No significant rash, abnormal pigmentation or lesions.  NEURO: Cranial nerves grossly intact.  Mentation and speech appropriate for age.  PSYCH: Mentation appears normal, affect normal/bright, judgement and insight intact, normal speech and appearance well-groomed.                Video-Visit Details    Video Start Time: 1720    Type of service:  Video Visit    Video End Time:5:32 PM    Originating Location (pt. Location): Home        Distant Location (provider location):  On-site    Platform used for Video Visit: [employer]

## 2022-12-07 NOTE — TELEPHONE ENCOUNTER
Routing refill request to provider for review/approval because:  Not sure if want to take topiramate taken off and this prescribed longer??    Milla HENDERSON RN

## 2022-12-08 RX ORDER — RIZATRIPTAN BENZOATE 5 MG/1
5 TABLET, ORALLY DISINTEGRATING ORAL
Qty: 20 TABLET | Refills: 1 | Status: SHIPPED | OUTPATIENT
Start: 2022-12-08

## 2022-12-12 ENCOUNTER — TELEPHONE (OUTPATIENT)
Dept: FAMILY MEDICINE | Facility: CLINIC | Age: 27
End: 2022-12-12

## 2022-12-12 NOTE — TELEPHONE ENCOUNTER
Prior Authorization Retail Medication Request    Medication/Dose: rizatriptan (MAXALT-MLT) 5 MG ODT   ICD code (if different than what is on RX):    Previously Tried and Failed:    Rationale:      Insurance Name: [employer]  Insurance ID:  [id number]      Pharmacy Information (if different than what is on RX)  [id number] - [place], [place] - [address]    Phone:  [phone]

## 2022-12-13 NOTE — TELEPHONE ENCOUNTER
Prior Authorization Not Needed per Insurance    Medication: rizatriptan (MAXALT-MLT) 5 MG ODT   Insurance Company: [employer] - Phone [phone] Fax [phone]  Expected CoPay:      Pharmacy Filling the Rx: [id number] - [place], [place] - [address]  Pharmacy Notified: Yes  Patient Notified: Yes    Patient's plan covers 12 tabs per 28 days, no further PA needed.

## 2022-12-13 NOTE — TELEPHONE ENCOUNTER
PA Initiation    Medication: rizatriptan (MAXALT-MLT) 5 MG ODT   Insurance Company: [employer] - Phone [phone] Fax [phone]  Pharmacy Filling the Rx: [id number] - [place], [place] - [address]  Filling Pharmacy Phone: [phone]  Filling Pharmacy Fax: [phone]  Start Date: 12/13/2022

## 2023-01-02 ENCOUNTER — VIRTUAL VISIT (OUTPATIENT)
Dept: FAMILY MEDICINE | Facility: CLINIC | Age: 28
End: 2023-01-02
Payer: COMMERCIAL

## 2023-01-02 DIAGNOSIS — G43.109 MIGRAINE WITH AURA AND WITHOUT STATUS MIGRAINOSUS, NOT INTRACTABLE: ICD-10-CM

## 2023-01-02 PROCEDURE — 99213 OFFICE O/P EST LOW 20 MIN: CPT | Mod: GT | Performed by: FAMILY MEDICINE

## 2023-01-02 ASSESSMENT — ENCOUNTER SYMPTOMS
HEADACHES: 0
GASTROINTESTINAL NEGATIVE: 1
CONSTITUTIONAL NEGATIVE: 1
EYES NEGATIVE: 1

## 2023-01-02 NOTE — PROGRESS NOTES
"David is a 27 year old who is being evaluated via a billable video visit.      How would you like to obtain your AVS? MyChart  If the video visit is dropped, the invitation should be resent by: Text to cell phone: [phone]  Will anyone else be joining your video visit? No        Assessment and Plan    (G43.109) Migraine with aura and without status migrainosus, not intractable  Comment: low burden of migraine curently, is satsified with Maxalt, will not start prophy at this time, is available should situration chagne  Plan:       RTC in [address] [place] [address]    Pako Gregorio MD      Subjective   David is a 27 year old, presenting for the following health issues:  No chief complaint on file.      History of Present Illness       Headaches:   Since the patient's last clinic visit, headaches are: improved  The patient is getting headaches:  0  She is able to do normal daily activities when she has a migraine.  The patient is taking the following rescue/relief medications:  Ibuprofen (Advil, Motrin) and other   Patient states \"I get total relief\" from the rescue/relief medications.   The patient is taking the following medications to prevent migraines:  No medications to prevent migraines  In the past 4 weeks, the patient has gone to an Urgent Care or Emergency Room 0 times times due to headaches.    She eats 2-3 servings of fruits and vegetables daily.She consumes 0 sweetened beverage(s) daily.She exercises with enough effort to increase her heart rate 30 to 60 minutes per day.  She exercises with enough effort to increase her heart rate 4 days per week.   She is taking medications regularly.     Never did start prophylactic medication.  Is focusing on using abortifacient.  Has not had a migraine in over a month.  When she does a single talet of Maxalt works to nip migraine in the bud.        Review of Systems   Constitutional: Negative.    Eyes: Negative.    Gastrointestinal: Negative.    Neurological: Negative for " headaches.           Objective           Vitals:  No vitals were obtained today due to virtual visit.    Physical Exam   GENERAL: Healthy, alert and no distress  EYES: Eyes grossly normal to inspection.  No discharge or erythema, or obvious scleral/conjunctival abnormalities.  RESP: No audible wheeze, cough, or visible cyanosis.  No visible retractions or increased work of breathing.    SKIN: Visible skin clear. No significant rash, abnormal pigmentation or lesions.  NEURO: Cranial nerves grossly intact.  Mentation and speech appropriate for age.  PSYCH: Mentation appears normal, affect normal/bright, judgement and insight intact, normal speech and appearance well-groomed.                Video-Visit Details    Type of service:  Video Visit   Start 1600  End 1606    Originating Location (pt. Location): Home  Distant Location (provider location):  Off-site  Platform used for Video Visit: [employer]

## 2023-04-27 NOTE — PROGRESS NOTES
IUD Removal:  SUBJECTIVE:    Is a pregnancy test required: No.  Was a consent obtained?  Yes    David Clement is a 28 year old female,, No LMP recorded. (Menstrual status: IUD). who presents today for IUD removal. Her current IUD was placed about 1 ago. She has not had problems with the IUD. She requests removal of the IUD because she desires to conceive, but wants to wait for a few months. She is moving now and wants to get the IUD out before she moves.     Today's PHQ-2 Score:       2023     3:44 PM   PHQ-2 (  [employer])   Q1: Little interest or pleasure in doing things 0   Q2: Feeling down, depressed or hopeless 0   PHQ-2 Score 0       PROCEDURE:    A speculum exam was performed and the cervix was visualized. The IUD string was visualized. Using ring forceps, the string  was grasped and the IUD removed intact.    POST PROCEDURE:    The patient tolerated the procedure well. Patient was discharged in stable condition.    Call if bleeding, pain or fever occur. and Birth control counseling given.     She as been on the patch before and wants to start it again, until ready to conceive. No contraindications noted for combined hormonal contraception. 1 year of Ortho Evra rx'd.     Encounter Diagnoses   Name Primary?     Encounter for removal of intrauterine contraceptive device Yes     Encounter for initial prescription of transdermal patch hormonal contraceptive device          PEE Aden CNM

## 2023-04-28 ENCOUNTER — OFFICE VISIT (OUTPATIENT)
Dept: MIDWIFE SERVICES | Facility: CLINIC | Age: 28
End: 2023-04-28
Payer: COMMERCIAL

## 2023-04-28 VITALS
SYSTOLIC BLOOD PRESSURE: 100 MMHG | HEIGHT: 70 IN | WEIGHT: 149 LBS | BODY MASS INDEX: 21.33 KG/M2 | DIASTOLIC BLOOD PRESSURE: 52 MMHG

## 2023-04-28 DIAGNOSIS — Z30.016 ENCOUNTER FOR INITIAL PRESCRIPTION OF TRANSDERMAL PATCH HORMONAL CONTRACEPTIVE DEVICE: ICD-10-CM

## 2023-04-28 DIAGNOSIS — Z30.432 ENCOUNTER FOR REMOVAL OF INTRAUTERINE CONTRACEPTIVE DEVICE: Primary | ICD-10-CM

## 2023-04-28 PROCEDURE — 58301 REMOVE INTRAUTERINE DEVICE: CPT | Performed by: ADVANCED PRACTICE MIDWIFE

## 2023-04-28 PROCEDURE — 99213 OFFICE O/P EST LOW 20 MIN: CPT | Mod: 25 | Performed by: ADVANCED PRACTICE MIDWIFE

## 2023-04-28 RX ORDER — NORELGESTROMIN AND ETHINYL ESTRADIOL 35; 150 UG/MG; UG/MG
PATCH TRANSDERMAL
Qty: 12 PATCH | Refills: 3 | Status: SHIPPED | OUTPATIENT
Start: 2023-04-28

## 2023-05-20 ENCOUNTER — HEALTH MAINTENANCE LETTER (OUTPATIENT)
Age: 28
End: 2023-05-20

## 2024-07-27 ENCOUNTER — HEALTH MAINTENANCE LETTER (OUTPATIENT)
Age: 29
End: 2024-07-27

## 2025-08-10 ENCOUNTER — HEALTH MAINTENANCE LETTER (OUTPATIENT)
Age: 30
End: 2025-08-10